# Patient Record
Sex: MALE | Race: WHITE | NOT HISPANIC OR LATINO | Employment: OTHER | URBAN - METROPOLITAN AREA
[De-identification: names, ages, dates, MRNs, and addresses within clinical notes are randomized per-mention and may not be internally consistent; named-entity substitution may affect disease eponyms.]

---

## 2024-01-29 ENCOUNTER — HOSPITAL ENCOUNTER (INPATIENT)
Facility: CLINIC | Age: 79
LOS: 2 days | Discharge: HOME | DRG: 698 | End: 2024-01-31
Attending: NURSE PRACTITIONER | Admitting: STUDENT IN AN ORGANIZED HEALTH CARE EDUCATION/TRAINING PROGRAM
Payer: COMMERCIAL

## 2024-01-29 ENCOUNTER — APPOINTMENT (OUTPATIENT)
Dept: CT IMAGING | Facility: CLINIC | Age: 79
DRG: 698 | End: 2024-01-29
Attending: NURSE PRACTITIONER
Payer: COMMERCIAL

## 2024-01-29 DIAGNOSIS — N39.0 UTI (URINARY TRACT INFECTION): ICD-10-CM

## 2024-01-29 DIAGNOSIS — R33.9 URINARY RETENTION WITH INCOMPLETE BLADDER EMPTYING: ICD-10-CM

## 2024-01-29 DIAGNOSIS — N39.0 URINARY TRACT INFECTION ASSOCIATED WITH INDWELLING URETHRAL CATHETER, INITIAL ENCOUNTER (H): Primary | ICD-10-CM

## 2024-01-29 DIAGNOSIS — T83.511A URINARY TRACT INFECTION ASSOCIATED WITH INDWELLING URETHRAL CATHETER, INITIAL ENCOUNTER (H): Primary | ICD-10-CM

## 2024-01-29 DIAGNOSIS — R74.02 ELEVATED SERUM LACTATE DEHYDROGENASE: ICD-10-CM

## 2024-01-29 DIAGNOSIS — A41.9 SEPSIS (H): ICD-10-CM

## 2024-01-29 DIAGNOSIS — R33.8 BENIGN PROSTATIC HYPERPLASIA WITH URINARY RETENTION: ICD-10-CM

## 2024-01-29 DIAGNOSIS — N40.1 BENIGN PROSTATIC HYPERPLASIA WITH URINARY RETENTION: ICD-10-CM

## 2024-01-29 LAB
ALBUMIN UR-MCNC: 100 MG/DL
ANION GAP SERPL CALCULATED.3IONS-SCNC: 14 MMOL/L (ref 7–15)
APPEARANCE UR: ABNORMAL
BASOPHILS # BLD AUTO: 0.1 10E3/UL (ref 0–0.2)
BASOPHILS NFR BLD AUTO: 0 %
BILIRUB UR QL STRIP: NEGATIVE
BUN SERPL-MCNC: 15.6 MG/DL (ref 8–23)
CALCIUM SERPL-MCNC: 10.3 MG/DL (ref 8.8–10.2)
CHLORIDE SERPL-SCNC: 104 MMOL/L (ref 98–107)
COLOR UR AUTO: YELLOW
CREAT SERPL-MCNC: 0.99 MG/DL (ref 0.67–1.17)
DEPRECATED HCO3 PLAS-SCNC: 18 MMOL/L (ref 22–29)
EGFRCR SERPLBLD CKD-EPI 2021: 78 ML/MIN/1.73M2
EOSINOPHIL # BLD AUTO: 0 10E3/UL (ref 0–0.7)
EOSINOPHIL NFR BLD AUTO: 0 %
ERYTHROCYTE [DISTWIDTH] IN BLOOD BY AUTOMATED COUNT: 13.5 % (ref 10–15)
GLUCOSE SERPL-MCNC: 163 MG/DL (ref 70–99)
GLUCOSE UR STRIP-MCNC: NEGATIVE MG/DL
HCT VFR BLD AUTO: 42.3 % (ref 40–53)
HGB BLD-MCNC: 14 G/DL (ref 13.3–17.7)
HGB UR QL STRIP: ABNORMAL
IMM GRANULOCYTES # BLD: 0.1 10E3/UL
IMM GRANULOCYTES NFR BLD: 0 %
KETONES UR STRIP-MCNC: NEGATIVE MG/DL
LACTATE SERPL-SCNC: 1.3 MMOL/L (ref 0.7–2)
LACTATE SERPL-SCNC: 2.7 MMOL/L (ref 0.7–2)
LACTATE SERPL-SCNC: 2.7 MMOL/L (ref 0.7–2)
LEUKOCYTE ESTERASE UR QL STRIP: ABNORMAL
LYMPHOCYTES # BLD AUTO: 0.6 10E3/UL (ref 0.8–5.3)
LYMPHOCYTES NFR BLD AUTO: 4 %
MCH RBC QN AUTO: 31.9 PG (ref 26.5–33)
MCHC RBC AUTO-ENTMCNC: 33.1 G/DL (ref 31.5–36.5)
MCV RBC AUTO: 96 FL (ref 78–100)
MONOCYTES # BLD AUTO: 1 10E3/UL (ref 0–1.3)
MONOCYTES NFR BLD AUTO: 7 %
MUCOUS THREADS #/AREA URNS LPF: PRESENT /LPF
NEUTROPHILS # BLD AUTO: 11.7 10E3/UL (ref 1.6–8.3)
NEUTROPHILS NFR BLD AUTO: 89 %
NITRATE UR QL: POSITIVE
NRBC # BLD AUTO: 0 10E3/UL
NRBC BLD AUTO-RTO: 0 /100
PH UR STRIP: 7 [PH] (ref 5–7)
PLATELET # BLD AUTO: 309 10E3/UL (ref 150–450)
POTASSIUM SERPL-SCNC: 4 MMOL/L (ref 3.4–5.3)
PROCALCITONIN SERPL IA-MCNC: 0.18 NG/ML
RBC # BLD AUTO: 4.39 10E6/UL (ref 4.4–5.9)
RBC URINE: 37 /HPF
SODIUM SERPL-SCNC: 136 MMOL/L (ref 135–145)
SP GR UR STRIP: 1.01 (ref 1–1.03)
TRANSITIONAL EPI: 2 /HPF
UROBILINOGEN UR STRIP-MCNC: NORMAL MG/DL
WBC # BLD AUTO: 13.4 10E3/UL (ref 4–11)
WBC CLUMPS #/AREA URNS HPF: PRESENT /HPF
WBC URINE: >182 /HPF

## 2024-01-29 PROCEDURE — 96361 HYDRATE IV INFUSION ADD-ON: CPT

## 2024-01-29 PROCEDURE — 81001 URINALYSIS AUTO W/SCOPE: CPT | Performed by: NURSE PRACTITIONER

## 2024-01-29 PROCEDURE — 99285 EMERGENCY DEPT VISIT HI MDM: CPT | Mod: 25

## 2024-01-29 PROCEDURE — 120N000001 HC R&B MED SURG/OB

## 2024-01-29 PROCEDURE — 87186 SC STD MICRODIL/AGAR DIL: CPT | Performed by: NURSE PRACTITIONER

## 2024-01-29 PROCEDURE — 83605 ASSAY OF LACTIC ACID: CPT | Performed by: NURSE PRACTITIONER

## 2024-01-29 PROCEDURE — 250N000011 HC RX IP 250 OP 636: Performed by: NURSE PRACTITIONER

## 2024-01-29 PROCEDURE — 85025 COMPLETE CBC W/AUTO DIFF WBC: CPT | Performed by: NURSE PRACTITIONER

## 2024-01-29 PROCEDURE — 80048 BASIC METABOLIC PNL TOTAL CA: CPT | Performed by: NURSE PRACTITIONER

## 2024-01-29 PROCEDURE — 258N000003 HC RX IP 258 OP 636

## 2024-01-29 PROCEDURE — 99222 1ST HOSP IP/OBS MODERATE 55: CPT | Mod: FS | Performed by: STUDENT IN AN ORGANIZED HEALTH CARE EDUCATION/TRAINING PROGRAM

## 2024-01-29 PROCEDURE — 36415 COLL VENOUS BLD VENIPUNCTURE: CPT | Performed by: NURSE PRACTITIONER

## 2024-01-29 PROCEDURE — 99207 PR APP CREDIT; MD BILLING SHARED VISIT: CPT

## 2024-01-29 PROCEDURE — 74176 CT ABD & PELVIS W/O CONTRAST: CPT

## 2024-01-29 PROCEDURE — 84145 PROCALCITONIN (PCT): CPT | Performed by: NURSE PRACTITIONER

## 2024-01-29 PROCEDURE — 87086 URINE CULTURE/COLONY COUNT: CPT | Performed by: NURSE PRACTITIONER

## 2024-01-29 PROCEDURE — 250N000013 HC RX MED GY IP 250 OP 250 PS 637: Performed by: STUDENT IN AN ORGANIZED HEALTH CARE EDUCATION/TRAINING PROGRAM

## 2024-01-29 PROCEDURE — 96365 THER/PROPH/DIAG IV INF INIT: CPT

## 2024-01-29 PROCEDURE — 99285 EMERGENCY DEPT VISIT HI MDM: CPT | Mod: 25 | Performed by: EMERGENCY MEDICINE

## 2024-01-29 PROCEDURE — 258N000003 HC RX IP 258 OP 636: Performed by: NURSE PRACTITIONER

## 2024-01-29 PROCEDURE — 87149 DNA/RNA DIRECT PROBE: CPT | Performed by: NURSE PRACTITIONER

## 2024-01-29 PROCEDURE — 250N000009 HC RX 250: Performed by: NURSE PRACTITIONER

## 2024-01-29 RX ORDER — LIDOCAINE HYDROCHLORIDE 20 MG/ML
JELLY TOPICAL ONCE
Status: COMPLETED | OUTPATIENT
Start: 2024-01-29 | End: 2024-01-29

## 2024-01-29 RX ORDER — CLONAZEPAM 0.5 MG/1
.25-.5 TABLET ORAL DAILY PRN
COMMUNITY
Start: 2023-11-20

## 2024-01-29 RX ORDER — POLYETHYLENE GLYCOL 3350 17 G/17G
17 POWDER, FOR SOLUTION ORAL DAILY PRN
Status: DISCONTINUED | OUTPATIENT
Start: 2024-01-29 | End: 2024-01-31 | Stop reason: HOSPADM

## 2024-01-29 RX ORDER — ACETAMINOPHEN 325 MG/1
650 TABLET ORAL EVERY 4 HOURS PRN
Status: DISCONTINUED | OUTPATIENT
Start: 2024-01-29 | End: 2024-01-31 | Stop reason: HOSPADM

## 2024-01-29 RX ORDER — ONDANSETRON 4 MG/1
4 TABLET, ORALLY DISINTEGRATING ORAL EVERY 6 HOURS PRN
Status: DISCONTINUED | OUTPATIENT
Start: 2024-01-29 | End: 2024-01-31 | Stop reason: HOSPADM

## 2024-01-29 RX ORDER — SODIUM CHLORIDE, SODIUM LACTATE, POTASSIUM CHLORIDE, CALCIUM CHLORIDE 600; 310; 30; 20 MG/100ML; MG/100ML; MG/100ML; MG/100ML
1000 INJECTION, SOLUTION INTRAVENOUS CONTINUOUS
Status: DISCONTINUED | OUTPATIENT
Start: 2024-01-29 | End: 2024-01-31

## 2024-01-29 RX ORDER — PANTOPRAZOLE SODIUM 40 MG/1
40 TABLET, DELAYED RELEASE ORAL 2 TIMES DAILY
Status: DISCONTINUED | OUTPATIENT
Start: 2024-01-29 | End: 2024-01-31 | Stop reason: HOSPADM

## 2024-01-29 RX ORDER — CEFTRIAXONE 2 G/1
2 INJECTION, POWDER, FOR SOLUTION INTRAMUSCULAR; INTRAVENOUS EVERY 24 HOURS
Status: DISCONTINUED | OUTPATIENT
Start: 2024-01-30 | End: 2024-01-31

## 2024-01-29 RX ORDER — CLONAZEPAM 0.5 MG/1
.25-.5 TABLET ORAL DAILY PRN
Status: DISCONTINUED | OUTPATIENT
Start: 2024-01-29 | End: 2024-01-30

## 2024-01-29 RX ORDER — CALCIUM CARBONATE 500 MG/1
1000 TABLET, CHEWABLE ORAL 4 TIMES DAILY PRN
Status: DISCONTINUED | OUTPATIENT
Start: 2024-01-29 | End: 2024-01-29

## 2024-01-29 RX ORDER — SODIUM CHLORIDE 9 MG/ML
INJECTION, SOLUTION INTRAVENOUS CONTINUOUS
Status: DISCONTINUED | OUTPATIENT
Start: 2024-01-29 | End: 2024-01-29

## 2024-01-29 RX ORDER — ONDANSETRON 2 MG/ML
4 INJECTION INTRAMUSCULAR; INTRAVENOUS EVERY 6 HOURS PRN
Status: DISCONTINUED | OUTPATIENT
Start: 2024-01-29 | End: 2024-01-31 | Stop reason: HOSPADM

## 2024-01-29 RX ORDER — LANSOPRAZOLE 30 MG/1
30 CAPSULE, DELAYED RELEASE ORAL 2 TIMES DAILY
COMMUNITY
Start: 2023-12-07

## 2024-01-29 RX ORDER — AMOXICILLIN 250 MG
1-2 CAPSULE ORAL 2 TIMES DAILY
Status: DISCONTINUED | OUTPATIENT
Start: 2024-01-29 | End: 2024-01-31 | Stop reason: HOSPADM

## 2024-01-29 RX ORDER — MULTIVITAMIN
1 TABLET ORAL DAILY
COMMUNITY
Start: 2023-11-20

## 2024-01-29 RX ORDER — LANSOPRAZOLE 30 MG/1
30 CAPSULE, DELAYED RELEASE ORAL 2 TIMES DAILY
Status: DISCONTINUED | OUTPATIENT
Start: 2024-01-29 | End: 2024-01-29

## 2024-01-29 RX ORDER — CEFTRIAXONE 2 G/1
2 INJECTION, POWDER, FOR SOLUTION INTRAMUSCULAR; INTRAVENOUS ONCE
Status: COMPLETED | OUTPATIENT
Start: 2024-01-29 | End: 2024-01-29

## 2024-01-29 RX ADMIN — CEFTRIAXONE SODIUM 2 G: 2 INJECTION, POWDER, FOR SOLUTION INTRAMUSCULAR; INTRAVENOUS at 11:38

## 2024-01-29 RX ADMIN — SODIUM CHLORIDE, POTASSIUM CHLORIDE, SODIUM LACTATE AND CALCIUM CHLORIDE 2586 ML: 600; 310; 30; 20 INJECTION, SOLUTION INTRAVENOUS at 12:11

## 2024-01-29 RX ADMIN — SODIUM CHLORIDE, POTASSIUM CHLORIDE, SODIUM LACTATE AND CALCIUM CHLORIDE 1000 ML: 600; 310; 30; 20 INJECTION, SOLUTION INTRAVENOUS at 17:00

## 2024-01-29 RX ADMIN — LIDOCAINE HYDROCHLORIDE: 20 JELLY TOPICAL at 10:32

## 2024-01-29 RX ADMIN — PANTOPRAZOLE SODIUM 40 MG: 40 TABLET, DELAYED RELEASE ORAL at 20:23

## 2024-01-29 ASSESSMENT — ACTIVITIES OF DAILY LIVING (ADL)
CONCENTRATING,_REMEMBERING_OR_MAKING_DECISIONS_DIFFICULTY: NO
ADLS_ACUITY_SCORE: 22
TOILETING_ISSUES: NO
ADLS_ACUITY_SCORE: 22
DOING_ERRANDS_INDEPENDENTLY_DIFFICULTY: YES
HEARING_DIFFICULTY_OR_DEAF: NO
WALKING_OR_CLIMBING_STAIRS_DIFFICULTY: NO
DRESSING/BATHING_DIFFICULTY: NO
FALL_HISTORY_WITHIN_LAST_SIX_MONTHS: NO
ADLS_ACUITY_SCORE: 22
ADLS_ACUITY_SCORE: 33
WEAR_GLASSES_OR_BLIND: NO
ADLS_ACUITY_SCORE: 35
DIFFICULTY_COMMUNICATING: NO
ADLS_ACUITY_SCORE: 35
ADLS_ACUITY_SCORE: 20
CHANGE_IN_FUNCTIONAL_STATUS_SINCE_ONSET_OF_CURRENT_ILLNESS/INJURY: NO
DIFFICULTY_EATING/SWALLOWING: NO

## 2024-01-29 NOTE — ED PROVIDER NOTES
History     Chief Complaint   Patient presents with    Catheter Problem     HPI  Jerry Jay is a 78 year old male who presents emergency room with complications with his Hendricks catheter.  Patient states the catheter stopped draining last evening.  He reports throughout the night he has developed suprapubic pain that has progressively worsened.  He currently rates his pain 10 out of 10.  He states he has tried to readjust the catheter without resolution.  He reports chronic shortness of breath that is unchanged.  He denies fevers, chills, sweats.  He reports he is chronically constipated.  He also reports he has a laser TURP scheduled for the middle of February.  Currently, he is utilizing the catheter is for lower bladder outlet obstruction secondary to benign prostatic hypertrophy.  He reports feeling well otherwise and denies any left or right-sided facial droop, speech difficulty, memory loss, chest pain.    Allergies:  Allergies   Allergen Reactions    Pcn [Penicillins] Hives       Problem List:    Patient Active Problem List    Diagnosis Date Noted    Sepsis (H) 2024     Priority: Medium    CARDIOVASCULAR SCREENING; LDL GOAL LESS THAN 160 10/31/2010     Priority: Medium        Past Medical History:    No past medical history on file.    Past Surgical History:    No past surgical history on file.    Family History:    No family history on file.    Social History:  Marital Status:  Single [1]  Social History     Tobacco Use    Smoking status: Former     Types: Cigarettes     Quit date: 1980     Years since quittin.5   Substance Use Topics    Alcohol use: No    Drug use: No        Medications:    clonazePAM (KLONOPIN) 0.5 MG tablet  LANsoprazole (PREVACID) 30 MG DR capsule  Multiple Vitamin (ONE-A-DAY ESSENTIAL) TABS      Review of Systems  As mentioned above in the history present illness. All other systems were reviewed and are negative.    Physical Exam   BP: (!) 156/99  Pulse: (!)  128  Resp: 18  Weight: 86.2 kg (190 lb)  SpO2: 96 %      Physical Exam  Vitals and nursing note reviewed.   Constitutional:       General: He is in acute distress.      Appearance: He is well-developed. He is not ill-appearing, toxic-appearing or diaphoretic.      Comments: Disheveled, dirty clothes   HENT:      Head: Normocephalic and atraumatic.      Right Ear: Hearing and external ear normal.      Left Ear: Hearing and external ear normal.      Nose: Nose normal.   Eyes:      General: No scleral icterus.        Right eye: No discharge.         Left eye: No discharge.      Conjunctiva/sclera: Conjunctivae normal.   Cardiovascular:      Rate and Rhythm: Normal rate and regular rhythm.      Heart sounds: Normal heart sounds. No murmur heard.     No friction rub. No gallop.   Pulmonary:      Effort: Pulmonary effort is normal. No respiratory distress.      Breath sounds: Normal breath sounds. No stridor. No wheezing or rales.   Abdominal:      General: Bowel sounds are normal. There is no distension.      Palpations: Abdomen is soft. There is no mass.      Tenderness: There is no abdominal tenderness. There is no right CVA tenderness, left CVA tenderness or guarding.      Hernia: No hernia is present.   Genitourinary:     Comments: Catheter is noted to be in place, some opaque mucus noted at the meatus.  Feces noted around the scrotum.  Musculoskeletal:         General: No tenderness.   Skin:     General: Skin is warm.      Capillary Refill: Capillary refill takes less than 2 seconds.      Findings: No rash.   Neurological:      Mental Status: He is alert and oriented to person, place, and time.   Psychiatric:         Mood and Affect: Mood normal.         Behavior: Behavior is cooperative.         ED Course              ED Course as of 01/29/24 1501   Mon Jan 29, 2024   1130 Lactic acid whole blood(!)  Lactic acid noted to be elevated at 2.7.  Possible dehydration and UTI versus urosepsis.  Will order fluid bolus,  cefepime and repeat lactic acid.   1311 UA with Microscopic reflex to Culture(!)  Urinalysis reveals positive nitrates, large leukocyte esterase, white blood cell clumps, greater than 182 white blood cells noted in urine consistent with UTI and possible urosepsis.   1312 CBC with platelets differential(!)  CBC is noted to be elevated at 13.4 could be inflammatory versus infectious etiology.  Initiating workup for sepsis.   1312 Procalcitonin  Procalcitonin reassuring at 0.18.   1312 CT Abdomen Pelvis w/o Contrast   1313 CT Abdomen Pelvis w/o Contrast  IMPRESSION:   1.  Mild to moderate bilateral hydroureteronephrosis to the  ureterovesical junctions, likely sequela of outlet obstruction.  2.  Nonobstructing left distal ureteral 0.4 cm calculus, and bilateral  nonobstructing renal calculi.  3.  High density urinary bladder fluid and perivesicular ill-defined  fluid/stranding (some of which is high density) could reflect blood  products and/or sequelae of infection. Correlate with urinalysis.  4.  Mild periureteral and perinephric inflammation could reflect  sequelae of pyelitis/pyelonephritis, not well assessed due to  noncontrast technique.  5.  Trace right greater than left pleural effusions.  6.  Indeterminate right adrenal gland thickening, and 2.2 cm  peritoneal nodule posterior to the right adrenal gland. Consider  correlation with outside imaging or attention on follow-up.     1446 Consultation completed with hospitalist Dr. Geo Jimenez, requesting hospitalization for urosepsis.  He agrees to the admission status.  Will place orders.  Dr. Jamison Steven is collaborative physician on care of this case.     Procedures      Results for orders placed or performed during the hospital encounter of 01/29/24 (from the past 24 hour(s))   Basic metabolic panel   Result Value Ref Range    Sodium 136 135 - 145 mmol/L    Potassium 4.0 3.4 - 5.3 mmol/L    Chloride 104 98 - 107 mmol/L    Carbon Dioxide (CO2) 18 (L) 22 - 29  mmol/L    Anion Gap 14 7 - 15 mmol/L    Urea Nitrogen 15.6 8.0 - 23.0 mg/dL    Creatinine 0.99 0.67 - 1.17 mg/dL    GFR Estimate 78 >60 mL/min/1.73m2    Calcium 10.3 (H) 8.8 - 10.2 mg/dL    Glucose 163 (H) 70 - 99 mg/dL   Lactic acid whole blood   Result Value Ref Range    Lactic Acid 2.7 (H) 0.7 - 2.0 mmol/L   CBC with platelets differential    Narrative    The following orders were created for panel order CBC with platelets differential.  Procedure                               Abnormality         Status                     ---------                               -----------         ------                     CBC with platelets and d...[[id number]]  Abnormal            Final result                 Please view results for these tests on the individual orders.   Procalcitonin   Result Value Ref Range    Procalcitonin 0.18 <0.50 ng/mL   CBC with platelets and differential   Result Value Ref Range    WBC Count 13.4 (H) 4.0 - 11.0 10e3/uL    RBC Count 4.39 (L) 4.40 - 5.90 10e6/uL    Hemoglobin 14.0 13.3 - 17.7 g/dL    Hematocrit 42.3 40.0 - 53.0 %    MCV 96 78 - 100 fL    MCH 31.9 26.5 - 33.0 pg    MCHC 33.1 31.5 - 36.5 g/dL    RDW 13.5 10.0 - 15.0 %    Platelet Count 309 150 - 450 10e3/uL    % Neutrophils 89 %    % Lymphocytes 4 %    % Monocytes 7 %    % Eosinophils 0 %    % Basophils 0 %    % Immature Granulocytes 0 %    NRBCs per 100 WBC 0 <1 /100    Absolute Neutrophils 11.7 (H) 1.6 - 8.3 10e3/uL    Absolute Lymphocytes 0.6 (L) 0.8 - 5.3 10e3/uL    Absolute Monocytes 1.0 0.0 - 1.3 10e3/uL    Absolute Eosinophils 0.0 0.0 - 0.7 10e3/uL    Absolute Basophils 0.1 0.0 - 0.2 10e3/uL    Absolute Immature Granulocytes 0.1 <=0.4 10e3/uL    Absolute NRBCs 0.0 10e3/uL   UA with Microscopic reflex to Culture    Specimen: Urine, Hendricks Catheter   Result Value Ref Range    Color Urine Yellow Colorless, Straw, Light Yellow, Yellow    Appearance Urine Cloudy (A) Clear    Glucose Urine Negative Negative mg/dL    Bilirubin Urine  Negative Negative    Ketones Urine Negative Negative mg/dL    Specific Gravity Urine 1.012 1.003 - 1.035    Blood Urine Small (A) Negative    pH Urine 7.0 5.0 - 7.0    Protein Albumin Urine 100 (A) Negative mg/dL    Urobilinogen Urine Normal Normal, 2.0 mg/dL    Nitrite Urine Positive (A) Negative    Leukocyte Esterase Urine Large (A) Negative    WBC Clumps Urine Present (A) None Seen /HPF    Mucus Urine Present (A) None Seen /LPF    RBC Urine 37 (H) <=2 /HPF    WBC Urine >182 (H) <=5 /HPF    Transitional Epithelials Urine 2 (H) <=1 /HPF    Narrative    Urine Culture ordered based on laboratory criteria   CT Abdomen Pelvis w/o Contrast    Narrative    CT ABDOMEN/PELVIS WITHOUT CONTRAST January 29, 2024 11:51 AM    CLINICAL HISTORY: Rule out ureteral obstructive process.     TECHNIQUE: CT scan of the abdomen and pelvis was performed without IV  contrast. Multiplanar reformats were obtained. Dose reduction  techniques were used.  CONTRAST: None.    COMPARISON: None available.    FINDINGS:   LOWER CHEST: Trace right greater than left pleural effusions and  adjacent atelectasis.    HEPATOBILIARY: No significant mass or bile duct dilatation. No  calcified gallstones.     PANCREAS: No significant mass, duct dilatation, or inflammatory  change.    SPLEEN: Unremarkable.    ADRENAL GLANDS: Low-attenuation right adrenal gland thickening.  Posterior and separate to the right adrenal gland is a 1.3 x 2.2 cm  low-attenuation nodule (5/40).    KIDNEYS: Bilateral mild to moderate hydroureteronephrosis to the  ureterovesical junctions. Bilateral mild periureteral and perinephric  fat stranding. Nonobstructing left distal 0.4 cm calculus (5/182).  Nonobstructing bilateral renal calculi.     Multiple urinary bladder calculi. Urinary bladder is decompressed by  Hendricks catheter. Ill-defined perivesicular fluid. High density urinary  bladder fluid and trace gas.    BOWEL: No obstruction or inflammatory change.    VASCULATURE:  Nonaneurysmal abdominal aorta.    LYMPH NODE AND PERITONEUM: No enlarged lymph node.    MUSCULOSKELETAL: No aggressive osseous lesion. Mild degenerative  changes of the spine.    OTHER: None.      Impression    IMPRESSION:   1.  Mild to moderate bilateral hydroureteronephrosis to the  ureterovesical junctions, likely sequela of outlet obstruction.  2.  Nonobstructing left distal ureteral 0.4 cm calculus, and bilateral  nonobstructing renal calculi.  3.  High density urinary bladder fluid and perivesicular ill-defined  fluid/stranding (some of which is high density) could reflect blood  products and/or sequelae of infection. Correlate with urinalysis.  4.  Mild periureteral and perinephric inflammation could reflect  sequelae of pyelitis/pyelonephritis, not well assessed due to  noncontrast technique.  5.  Trace right greater than left pleural effusions.  6.  Indeterminate right adrenal gland thickening, and 2.2 cm  peritoneal nodule posterior to the right adrenal gland. Consider  correlation with outside imaging or attention on follow-up.   Lactic acid whole blood   Result Value Ref Range    Lactic Acid 2.7 (H) 0.7 - 2.0 mmol/L       Medications   lidocaine (XYLOCAINE) 2 % external gel ( Urethral $Given 1/29/24 1032)   lactated ringers BOLUS 2,586 mL (2,586 mLs Intravenous $New Bag 1/29/24 1211)   cefTRIAXone (ROCEPHIN) 2 g vial to attach to  ml bag for ADULTS or NS 50 ml bag for PEDS (0 g Intravenous Stopped 1/29/24 1210)       Assessments & Plan (with Medical Decision Making)     I have reviewed the nursing notes.    I have reviewed the findings, diagnosis, plan and need for follow up with the patient.  Jerry Jay is a 78 year old male who presents emergency room with complications with his Hendricks catheter.  Patient states the catheter stopped draining last evening.  He reports throughout the night he has developed suprapubic pain that has progressively worsened.  He currently rates his pain 10 out of  10.  He states he has tried to readjust the catheter without resolution.  He reports chronic shortness of breath that is unchanged.  He denies fevers, chills, sweats.  He reports he is chronically constipated.  He also reports he has a laser TURP scheduled for the middle of February.  Currently, he is utilizing the catheter is for lower bladder outlet obstruction secondary to benign prostatic hypertrophy.  Workup to evaluate for UTI versus sepsis as patient is tachycardic.      Initial lactate is elevated resulting in orders for blood cultures, fluid resuscitation, antibiotic, and repeat lactate, UA, UC.  Following fluid resuscitation, repeat lactate is still elevated at 2.7.  CT scan of the abdomen pelvis reveals no ureteral stone process.  No history on urine cultures for MRSA and therefore vancomycin not initiated.  Phone consultation completed with hospitalist who agrees to admission for placement.  Discussed patient may be a good candidate for outpatient nursing care or  for assistance with home care.  This was agreed upon and admission orders placed.  Dr. Jamison Steven is collaborative physician in care of this patient.      New Prescriptions    No medications on file       Final diagnoses:   Sepsis (H)   Urinary retention with incomplete bladder emptying   UTI (urinary tract infection)   Elevated serum lactate dehydrogenase   Benign prostatic hyperplasia with urinary retention       1/29/2024   [facility] EMERGENCY DEPT       Robert, Lorena Gilmore, APRN CNP  01/29/24 6684

## 2024-01-29 NOTE — ED TRIAGE NOTES
Catheter stopped draining sometime in the night, feeling bladder distention and pain     Triage Assessment (Adult)       Row Name 01/29/24 0925          Triage Assessment    Airway WDL WDL        Respiratory WDL    Respiratory WDL WDL        Peripheral/Neurovascular WDL    Peripheral Neurovascular WDL WDL        Cognitive/Neuro/Behavioral WDL    Cognitive/Neuro/Behavioral WDL WDL

## 2024-01-29 NOTE — ED NOTES
[facility]   Admission Handoff    The patient is Jerry Jay, 78 year old who arrived in the ED by WALKED from home with a complaint of Catheter Problem  . The patient's current symptoms are a recurrence of a past episode and during this time the symptoms have increased. In the ED, patient was diagnosed with   Final diagnoses:   Sepsis (H)   Urinary retention with incomplete bladder emptying   UTI (urinary tract infection)   Elevated serum lactate dehydrogenase   Benign prostatic hyperplasia with urinary retention         Needed?: No    Allergies:    Allergies   Allergen Reactions    Pcn [Penicillins] Hives       Past Medical Hx: No past medical history on file.    Initial vitals were: BP: (!) 156/99  Pulse: (!) 128  Resp: 18  Weight: 86.2 kg (190 lb)  SpO2: 96 %   Recent vital Signs: BP (!) 154/87   Pulse 108   Resp 18   Wt 86.2 kg (190 lb)   SpO2 98%     Elimination Status: Continent: indwelling catheter     Activity Level: SBA    Fall Status: Reason for falls risk: Elimination  nonskid shoes/slippers when out of bed, arm band in place, and patient and family education    Baseline Mental status: WDL  Current Mental Status changes: at basesline    Infection present or suspected this encounter: cultures pending  Sepsis suspected: Yes    Isolation type: n/a    Bariatric equipment needed?: No    In the ED these meds were given:   Medications   lidocaine (XYLOCAINE) 2 % external gel ( Urethral $Given 1/29/24 1032)   lactated ringers BOLUS 2,586 mL (2,586 mLs Intravenous $New Bag 1/29/24 1211)   cefTRIAXone (ROCEPHIN) 2 g vial to attach to  ml bag for ADULTS or NS 50 ml bag for PEDS (0 g Intravenous Stopped 1/29/24 1210)       Drips running?  Yes    Home pump  No    Current LDAs: Peripheral IV: Site left wrist; Gauge 20G  lactated Ringer's     Results:   Labs/Imaging  Ordered and Resulted from Time of ED Arrival Up to the Time of Departure from the ED  Results for  orders placed or performed during the hospital encounter of 01/29/24 (from the past 24 hour(s))   Basic metabolic panel   Result Value Ref Range    Sodium 136 135 - 145 mmol/L    Potassium 4.0 3.4 - 5.3 mmol/L    Chloride 104 98 - 107 mmol/L    Carbon Dioxide (CO2) 18 (L) 22 - 29 mmol/L    Anion Gap 14 7 - 15 mmol/L    Urea Nitrogen 15.6 8.0 - 23.0 mg/dL    Creatinine 0.99 0.67 - 1.17 mg/dL    GFR Estimate 78 >60 mL/min/1.73m2    Calcium 10.3 (H) 8.8 - 10.2 mg/dL    Glucose 163 (H) 70 - 99 mg/dL   Lactic acid whole blood   Result Value Ref Range    Lactic Acid 2.7 (H) 0.7 - 2.0 mmol/L   CBC with platelets differential    Narrative    The following orders were created for panel order CBC with platelets differential.  Procedure                               Abnormality         Status                     ---------                               -----------         ------                     CBC with platelets and d...[[id number]]  Abnormal            Final result                 Please view results for these tests on the individual orders.   Procalcitonin   Result Value Ref Range    Procalcitonin 0.18 <0.50 ng/mL   CBC with platelets and differential   Result Value Ref Range    WBC Count 13.4 (H) 4.0 - 11.0 10e3/uL    RBC Count 4.39 (L) 4.40 - 5.90 10e6/uL    Hemoglobin 14.0 13.3 - 17.7 g/dL    Hematocrit 42.3 40.0 - 53.0 %    MCV 96 78 - 100 fL    MCH 31.9 26.5 - 33.0 pg    MCHC 33.1 31.5 - 36.5 g/dL    RDW 13.5 10.0 - 15.0 %    Platelet Count 309 150 - 450 10e3/uL    % Neutrophils 89 %    % Lymphocytes 4 %    % Monocytes 7 %    % Eosinophils 0 %    % Basophils 0 %    % Immature Granulocytes 0 %    NRBCs per 100 WBC 0 <1 /100    Absolute Neutrophils 11.7 (H) 1.6 - 8.3 10e3/uL    Absolute Lymphocytes 0.6 (L) 0.8 - 5.3 10e3/uL    Absolute Monocytes 1.0 0.0 - 1.3 10e3/uL    Absolute Eosinophils 0.0 0.0 - 0.7 10e3/uL    Absolute Basophils 0.1 0.0 - 0.2 10e3/uL    Absolute Immature Granulocytes 0.1 <=0.4 10e3/uL    Absolute  NRBCs 0.0 10e3/uL   UA with Microscopic reflex to Culture    Specimen: Urine, Hendricks Catheter   Result Value Ref Range    Color Urine Yellow Colorless, Straw, Light Yellow, Yellow    Appearance Urine Cloudy (A) Clear    Glucose Urine Negative Negative mg/dL    Bilirubin Urine Negative Negative    Ketones Urine Negative Negative mg/dL    Specific Gravity Urine 1.012 1.003 - 1.035    Blood Urine Small (A) Negative    pH Urine 7.0 5.0 - 7.0    Protein Albumin Urine 100 (A) Negative mg/dL    Urobilinogen Urine Normal Normal, 2.0 mg/dL    Nitrite Urine Positive (A) Negative    Leukocyte Esterase Urine Large (A) Negative    WBC Clumps Urine Present (A) None Seen /HPF    Mucus Urine Present (A) None Seen /LPF    RBC Urine 37 (H) <=2 /HPF    WBC Urine >182 (H) <=5 /HPF    Transitional Epithelials Urine 2 (H) <=1 /HPF    Narrative    Urine Culture ordered based on laboratory criteria   CT Abdomen Pelvis w/o Contrast    Narrative    CT ABDOMEN/PELVIS WITHOUT CONTRAST January 29, 2024 11:51 AM    CLINICAL HISTORY: Rule out ureteral obstructive process.     TECHNIQUE: CT scan of the abdomen and pelvis was performed without IV  contrast. Multiplanar reformats were obtained. Dose reduction  techniques were used.  CONTRAST: None.    COMPARISON: None available.    FINDINGS:   LOWER CHEST: Trace right greater than left pleural effusions and  adjacent atelectasis.    HEPATOBILIARY: No significant mass or bile duct dilatation. No  calcified gallstones.     PANCREAS: No significant mass, duct dilatation, or inflammatory  change.    SPLEEN: Unremarkable.    ADRENAL GLANDS: Low-attenuation right adrenal gland thickening.  Posterior and separate to the right adrenal gland is a 1.3 x 2.2 cm  low-attenuation nodule (5/40).    KIDNEYS: Bilateral mild to moderate hydroureteronephrosis to the  ureterovesical junctions. Bilateral mild periureteral and perinephric  fat stranding. Nonobstructing left distal 0.4 cm calculus (5/182).  Nonobstructing  bilateral renal calculi.     Multiple urinary bladder calculi. Urinary bladder is decompressed by  Hendricks catheter. Ill-defined perivesicular fluid. High density urinary  bladder fluid and trace gas.    BOWEL: No obstruction or inflammatory change.    VASCULATURE: Nonaneurysmal abdominal aorta.    LYMPH NODE AND PERITONEUM: No enlarged lymph node.    MUSCULOSKELETAL: No aggressive osseous lesion. Mild degenerative  changes of the spine.    OTHER: None.      Impression    IMPRESSION:   1.  Mild to moderate bilateral hydroureteronephrosis to the  ureterovesical junctions, likely sequela of outlet obstruction.  2.  Nonobstructing left distal ureteral 0.4 cm calculus, and bilateral  nonobstructing renal calculi.  3.  High density urinary bladder fluid and perivesicular ill-defined  fluid/stranding (some of which is high density) could reflect blood  products and/or sequelae of infection. Correlate with urinalysis.  4.  Mild periureteral and perinephric inflammation could reflect  sequelae of pyelitis/pyelonephritis, not well assessed due to  noncontrast technique.  5.  Trace right greater than left pleural effusions.  6.  Indeterminate right adrenal gland thickening, and 2.2 cm  peritoneal nodule posterior to the right adrenal gland. Consider  correlation with outside imaging or attention on follow-up.   Lactic acid whole blood   Result Value Ref Range    Lactic Acid 2.7 (H) 0.7 - 2.0 mmol/L       For the majority of the shift this patient's behavior was Green     Cardiac Rhythm: Tachycardia  Pt needs tele? Yes  Skin/wound Issues: None    Code Status: not addressed    Pain control: good    Nausea control: good    Abnormal labs/tests/findings requiring intervention: elevated lactic    Patient tested for COVID 19 prior to admission: NO     OBS brochure/video discussed/provided to patient/family: N/A     Family present during ED course? No     Family Comments/Social Situation comments: Lives alone at home    Tasks needing  completion:  Finish up the rest of the bolus.  On pump    Love Villalpando RN

## 2024-01-29 NOTE — H&P
[facility]    History and Physical  Hospital Medicine       Date of Admission:  1/29/2024  Date of Service: 1/29/2024     Assessment & Plan   Jerry Jay is a 78 year old male with a medical history of Obstructive uropathy s/p bilateral renal stent placement, GERD, nephrolithiasis, complicated UTI, right adrenal mass who presents on 1/29/2024 with abdominal pain. He is found to have acute pyelonephritis and is being admitted for management.     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 Presents with abdominal pain & clogged catheter x1day.  Astorga exchanged in ER & now draining.    Septic based on leukocytosis (13.4), tachycardia (128bpm). UA after switching astorga catheter appears obviuosly infected (cloudy, positive nitrite, small blood, large leuk esterase with >182 WBC, 37 RBC, mucus. 2 transitional epi cells). CT shows mild to moderate bilateral hydroureteronephrosis to ureterovesical junctions, non-obstructing calculi bilaterally, high density urinary bladder fluid & stranding probably blood or sequelae of infection, periureteral & perinephric inflammation. Initiated on ceftriaxone 2g in ER.   -Continue ceftriaxone 2g IV Q24hrs  -Lactated ringer 100/hr continuous for gentle fluid maintenance  -Measure urine output  -Good astorga cares  -Blood cultures x2 1/29 NGTD  -Urine culture 1/29 NGTD  -CBC & CMP in AM  -Continue outpatient follow up with urology for ongoing prep for planned TURP 2/13/24.     Failure to thrive  Patient presented to ER covered in stool, and per EMS report appeared to be having difficulty adequately providing sanitary astorga cares for himself at home. This likely contributed to acute infection, & patient may  benefit from sdditional help at home or placement for adequate cares.   -PT & OT consults  -Care management consult for safe disposition planning    Renal calculi  Multiple non-obstructing stones bilaterally on CT imaging 1/29, as well as 0.4cm non-obstructing calculus in left distal ureter. Patient is s/p bilateral ureteral stents with removal on 12/28/23 for history of obstructing stones.   -Monitor, continue outpatient follow up for ongoing management. No acute interventions warranted today for non-obstructing calculi.     Anxiety  Noted in history, managed PTA with clonazepam 0.25-0.5mg daily PRN for anxiety.,   -Continue PTA clonazepam, use judiciously    GERD  Endorses typical symptoms present on admission. Managed PTA with lansoprazole 30mg BID, continue.     Dysphagia  Endorses difficulty swallowing at baseline and follows soft diet. Per chart review has documented history of dysphagia on swallowing study. Prefers mac n cheese and eggs. Does not sound like he is thickening liquids but per chart review he is supposed to do this due to aspiration with thin & honey consistencies.   -Minced & moist diet  -Swallow consult to assist with proper thickening requirements    Clinically Significant Risk Factors Present on Admission           # Hypercalcemia: Highest Ca = 10.3 mg/dL in last 2 days, will monitor as appropriate                         Diet:  regular  DVT Prophylaxis: Ambulate every shift  Astorga Catheter: PRESENT, indication: Obstruction;Retention  Code Status:  full code  Lines: PIV, astorga catheter    Disposition Plan      Expected Discharge Date: 01/31/2024             Entered: Radha Ortiz PA-C 01/29/2024, 3:20 PM     Status: Patient is appropriate for inpatient  Radha Ortiz PA-C        The patient's care was discussed with the Attending Physician, Dr. Cuate Jacobo, bedside RN, and the patient .    Primary Care Physician   Layne Pedraza    History is obtained from the patient, who  is an ok historian, handoff from ER provider, and review of old records via the EMR.    History of Present Illness   Jerry Jay is a 78 year old male with past medical history of Obstructive uropathy, GERD, nephrolithiasis, complicated UTI, right adrenal mass now presents on 1/29/2024 with abdominal pain & decreased output from catheter.     Patient was previously admitted to [facility] 11/23 - 11/25/23 for obstructive uropathy & abdominal bloating. Has a history of UTI with pansensitive proteus mirabilis, presented 11/23 with creatinine 6.78 and difficulty urinating that was relieved with catheter. Urology placed bilateral ureteral stents.   Followed with urology in clinic 12/28/23 at which time ureterla stents were removed & TURP was planned for Feb 13, 2024. Patient has had a chronic astorga since then.     Patient noticed evening 1/28 that his astorga was not draining. He has had an issue previously with astorga clogging requiring replacement. He attempted to resolve, but was unable to get catheter to drain. He developed suprapubic abdominal pain overnight. No back or flank pain. He began leaking urine out of penis around catheter, and pain became unbearable so he presented to ER for ongoing workup.   Since arrival to ER, he has been slightly nauseous, with episode of emesis. He endorses some mild sweats alternating with chills beginning early AM 1/29.     Chronic cough productive of scant white sputum is unchanged from baseline.     Upon arrival to ER patient received lab and imaging workup. He was initiated on ceftriaxone and astorga catheter was exchanged.     Review of Systems   Constitutional: denies weight loss  Eyes: denies changes in vision  HENT: denies changes in hearing  Respiratory: denies new or changing cough, dyspnea  Cardiovascular: denies chest pain, heart palpitations  Gastroenterology: denies constipation, diarrhea, GERD symptoms  Genitourinary: see HPI  Integumentary: no new  rashes or skin changes  Musculoskeletal: denies new muscle pain or joint trauma  Neuro: denies numbness, tingling, headaches, tremor  Psychiatric: denies significant changes to mood    Past Medical History    -Obstructive uropathy  -GERD  -Nephrolithiasis  -Complicated UTI  -Right adrenal mass    Past Surgical History   No past surgical history on file.     Prior to Admission Medications   Prior to Admission Medications   Prescriptions Last Dose Informant Patient Reported? Taking?   LANsoprazole (PREVACID) 30 MG DR capsule 1/29/2024 at am Self Yes Yes   Sig: Take 30 mg by mouth 2 times daily   Multiple Vitamin (ONE-A-DAY ESSENTIAL) TABS 1/29/2024 at am Self Yes Yes   Sig: Take 1 tablet by mouth daily   clonazePAM (KLONOPIN) 0.5 MG tablet 1/29/2024 at am Self Yes Yes   Sig: Take 0.25-0.5 mg by mouth daily as needed for anxiety or agitation      Facility-Administered Medications: None     Allergies   Allergies   Allergen Reactions    Pcn [Penicillins] Hives       Family History    No family history on file.    Social History   Social History     Socioeconomic History    Marital status: Single     Physical Exam   BP (!) 154/87   Pulse 108   Resp 18   Wt 86.2 kg (190 lb)   SpO2 98%      Weight: 190 lbs 0 oz There is no height or weight on file to calculate BMI.     Constitutional: Alert, cooperative, no apparent distress, appears nontoxic  Eyes: Eyes are clear  HENT: Lipoma on crown of head, non-tender. No evidence of cranial trauma.  Cardiovascular: Regular rate and rhythm, normal S1 and S2, and no murmur noted. Good peripheral pulses in wrists bilaterally. No pitting lower extremity edema.  Respiratory: Clear to auscultation bilaterally. Unlabored on room air.   GI: mildly protuberant & firm but not hard. Tender to palpation over suprapubic & LLQ. Nontender in other regions. No rebound, but does have voluntary guarding in LLQ. No involuntary guarding.   Genitourinary: Yellow urine with some sediment output from  astorga catheter. Some bloody mucus noticed around catheter insertion site.   Musculoskeletal: Normal muscle bulk, no obvious joint deformities.   Skin: Warm and dry, no rashes.   Neurologic: Neck supple.  is symmetric. Speech intact without facial droop.     Data   Data reviewed today:   Recent Labs   Lab 01/29/24  1043   WBC 13.4*   HGB 14.0   MCV 96         POTASSIUM 4.0   CHLORIDE 104   CO2 18*   BUN 15.6   CR 0.99   ANIONGAP 14   VADIM 10.3*   *     Recent Results (from the past 24 hour(s))   CT Abdomen Pelvis w/o Contrast    Narrative    CT ABDOMEN/PELVIS WITHOUT CONTRAST January 29, 2024 11:51 AM    CLINICAL HISTORY: Rule out ureteral obstructive process.     TECHNIQUE: CT scan of the abdomen and pelvis was performed without IV  contrast. Multiplanar reformats were obtained. Dose reduction  techniques were used.  CONTRAST: None.    COMPARISON: None available.    FINDINGS:   LOWER CHEST: Trace right greater than left pleural effusions and  adjacent atelectasis.    HEPATOBILIARY: No significant mass or bile duct dilatation. No  calcified gallstones.     PANCREAS: No significant mass, duct dilatation, or inflammatory  change.    SPLEEN: Unremarkable.    ADRENAL GLANDS: Low-attenuation right adrenal gland thickening.  Posterior and separate to the right adrenal gland is a 1.3 x 2.2 cm  low-attenuation nodule (5/40).    KIDNEYS: Bilateral mild to moderate hydroureteronephrosis to the  ureterovesical junctions. Bilateral mild periureteral and perinephric  fat stranding. Nonobstructing left distal 0.4 cm calculus (5/182).  Nonobstructing bilateral renal calculi.     Multiple urinary bladder calculi. Urinary bladder is decompressed by  Astorga catheter. Ill-defined perivesicular fluid. High density urinary  bladder fluid and trace gas.    BOWEL: No obstruction or inflammatory change.    VASCULATURE: Nonaneurysmal abdominal aorta.    LYMPH NODE AND PERITONEUM: No enlarged lymph  node.    MUSCULOSKELETAL: No aggressive osseous lesion. Mild degenerative  changes of the spine.    OTHER: None.      Impression    IMPRESSION:   1.  Mild to moderate bilateral hydroureteronephrosis to the  ureterovesical junctions, likely sequela of outlet obstruction.  2.  Nonobstructing left distal ureteral 0.4 cm calculus, and bilateral  nonobstructing renal calculi.  3.  High density urinary bladder fluid and perivesicular ill-defined  fluid/stranding (some of which is high density) could reflect blood  products and/or sequelae of infection. Correlate with urinalysis.  4.  Mild periureteral and perinephric inflammation could reflect  sequelae of pyelitis/pyelonephritis, not well assessed due to  noncontrast technique.  5.  Trace right greater than left pleural effusions.  6.  Indeterminate right adrenal gland thickening, and 2.2 cm  peritoneal nodule posterior to the right adrenal gland. Consider  correlation with outside imaging or attention on follow-up.

## 2024-01-29 NOTE — ED NOTES
"Pt arrived with urinary retention and an indwelling astorga catheter not draining.  RN attempted to flush without success.  Indwelling catheter removed.  Camryn-care done for patient.  Pt asked if he had showered lately as there as dried stool on penis and testicles.  Pt states he \"wiped it down\" yesterday and does so every other day.  Pt's hands are dirty and is touching catheter.  Pt instructed to wash hands frequently and not touch catheter tube due to risk of infection.  New cather inserted.  Pt states pressure is decreasing and feels more comfortable.  "

## 2024-01-29 NOTE — MEDICATION SCRIBE - ADMISSION MEDICATION HISTORY
Medication Scribe Admission Medication History    Admission medication history is complete. The information provided in this note is only as accurate as the sources available at the time of the update.    Information Source(s): Patient and CareEverywhere/SureScripts via  with patient in room and finished at desk.    Pertinent Information: Patient finished a script for Cephalexin this past month.  Also had some Norco and Tamsulosin that he finished at the beginning of this month.  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  States that he is having a green light procedure on 2/13/24.    Changes made to PTA medication list:  Added: Clonazepam 0.5 mg, Lansoprazole 30 mg, MVI tab.  Deleted: Esomeprazole PO.  Changed: None    Allergies reviewed with patient and updates made in EHR: yes, no change.  He also states that mood medicines do not work for him.  He took something (? Name of it) after his father had passed away.    Medication History Completed By: Pricila Cody 1/29/2024 1:58 PM    PTA Med List   Medication Sig Last Dose    clonazePAM (KLONOPIN) 0.5 MG tablet Take 0.25-0.5 mg by mouth daily as needed for anxiety or agitation 1/29/2024 at am    LANsoprazole (PREVACID) 30 MG DR capsule Take 30 mg by mouth 2 times daily 1/29/2024 at am    Multiple Vitamin (ONE-A-DAY ESSENTIAL) TABS Take 1 tablet by mouth daily 1/29/2024 at am

## 2024-01-30 ENCOUNTER — APPOINTMENT (OUTPATIENT)
Dept: PHYSICAL THERAPY | Facility: CLINIC | Age: 79
DRG: 698 | End: 2024-01-30
Payer: COMMERCIAL

## 2024-01-30 ENCOUNTER — APPOINTMENT (OUTPATIENT)
Dept: SPEECH THERAPY | Facility: CLINIC | Age: 79
DRG: 698 | End: 2024-01-30
Payer: COMMERCIAL

## 2024-01-30 LAB
ALBUMIN SERPL BCG-MCNC: 3.2 G/DL (ref 3.5–5.2)
ALP SERPL-CCNC: 77 U/L (ref 40–150)
ALT SERPL W P-5'-P-CCNC: 8 U/L (ref 0–70)
ANION GAP SERPL CALCULATED.3IONS-SCNC: 13 MMOL/L (ref 7–15)
AST SERPL W P-5'-P-CCNC: 16 U/L (ref 0–45)
BACTERIA UR CULT: ABNORMAL
BACTERIA UR CULT: ABNORMAL
BILIRUB SERPL-MCNC: 1.5 MG/DL
BUN SERPL-MCNC: 10.2 MG/DL (ref 8–23)
CALCIUM SERPL-MCNC: 9.2 MG/DL (ref 8.8–10.2)
CHLORIDE SERPL-SCNC: 106 MMOL/L (ref 98–107)
CREAT SERPL-MCNC: 0.8 MG/DL (ref 0.67–1.17)
DEPRECATED HCO3 PLAS-SCNC: 20 MMOL/L (ref 22–29)
EGFRCR SERPLBLD CKD-EPI 2021: >90 ML/MIN/1.73M2
ERYTHROCYTE [DISTWIDTH] IN BLOOD BY AUTOMATED COUNT: 13.9 % (ref 10–15)
GLUCOSE SERPL-MCNC: 110 MG/DL (ref 70–99)
HCT VFR BLD AUTO: 34.4 % (ref 40–53)
HGB BLD-MCNC: 11.3 G/DL (ref 13.3–17.7)
MCH RBC QN AUTO: 32.2 PG (ref 26.5–33)
MCHC RBC AUTO-ENTMCNC: 32.8 G/DL (ref 31.5–36.5)
MCV RBC AUTO: 98 FL (ref 78–100)
PLATELET # BLD AUTO: 215 10E3/UL (ref 150–450)
POTASSIUM SERPL-SCNC: 3.8 MMOL/L (ref 3.4–5.3)
PROT SERPL-MCNC: 6.2 G/DL (ref 6.4–8.3)
RBC # BLD AUTO: 3.51 10E6/UL (ref 4.4–5.9)
SODIUM SERPL-SCNC: 139 MMOL/L (ref 135–145)
WBC # BLD AUTO: 10.2 10E3/UL (ref 4–11)

## 2024-01-30 PROCEDURE — 99232 SBSQ HOSP IP/OBS MODERATE 35: CPT | Performed by: STUDENT IN AN ORGANIZED HEALTH CARE EDUCATION/TRAINING PROGRAM

## 2024-01-30 PROCEDURE — 999N000111 HC STATISTIC OT IP EVAL DEFER

## 2024-01-30 PROCEDURE — 92610 EVALUATE SWALLOWING FUNCTION: CPT | Mod: GN | Performed by: SPEECH-LANGUAGE PATHOLOGIST

## 2024-01-30 PROCEDURE — 258N000003 HC RX IP 258 OP 636

## 2024-01-30 PROCEDURE — 80053 COMPREHEN METABOLIC PANEL: CPT

## 2024-01-30 PROCEDURE — 36415 COLL VENOUS BLD VENIPUNCTURE: CPT

## 2024-01-30 PROCEDURE — 97161 PT EVAL LOW COMPLEX 20 MIN: CPT | Mod: GP

## 2024-01-30 PROCEDURE — 120N000001 HC R&B MED SURG/OB

## 2024-01-30 PROCEDURE — 97530 THERAPEUTIC ACTIVITIES: CPT | Mod: GP

## 2024-01-30 PROCEDURE — 250N000011 HC RX IP 250 OP 636

## 2024-01-30 PROCEDURE — 85027 COMPLETE CBC AUTOMATED: CPT

## 2024-01-30 PROCEDURE — 250N000013 HC RX MED GY IP 250 OP 250 PS 637

## 2024-01-30 PROCEDURE — 250N000013 HC RX MED GY IP 250 OP 250 PS 637: Performed by: STUDENT IN AN ORGANIZED HEALTH CARE EDUCATION/TRAINING PROGRAM

## 2024-01-30 RX ORDER — CLONAZEPAM 0.25 MG/1
0.25 TABLET, ORALLY DISINTEGRATING ORAL DAILY PRN
Status: DISCONTINUED | OUTPATIENT
Start: 2024-01-30 | End: 2024-01-31 | Stop reason: HOSPADM

## 2024-01-30 RX ORDER — CLONAZEPAM 0.5 MG/1
0.5 TABLET ORAL DAILY PRN
Status: DISCONTINUED | OUTPATIENT
Start: 2024-01-30 | End: 2024-01-31 | Stop reason: HOSPADM

## 2024-01-30 RX ADMIN — SODIUM CHLORIDE, POTASSIUM CHLORIDE, SODIUM LACTATE AND CALCIUM CHLORIDE 1000 ML: 600; 310; 30; 20 INJECTION, SOLUTION INTRAVENOUS at 16:03

## 2024-01-30 RX ADMIN — PANTOPRAZOLE SODIUM 40 MG: 40 TABLET, DELAYED RELEASE ORAL at 08:43

## 2024-01-30 RX ADMIN — CEFTRIAXONE SODIUM 2 G: 2 INJECTION, POWDER, FOR SOLUTION INTRAMUSCULAR; INTRAVENOUS at 10:49

## 2024-01-30 RX ADMIN — ONDANSETRON 4 MG: 2 INJECTION INTRAMUSCULAR; INTRAVENOUS at 06:25

## 2024-01-30 RX ADMIN — SODIUM CHLORIDE, POTASSIUM CHLORIDE, SODIUM LACTATE AND CALCIUM CHLORIDE 1000 ML: 600; 310; 30; 20 INJECTION, SOLUTION INTRAVENOUS at 03:09

## 2024-01-30 RX ADMIN — CLONAZEPAM 0.5 MG: 0.5 TABLET ORAL at 06:30

## 2024-01-30 ASSESSMENT — ACTIVITIES OF DAILY LIVING (ADL)
ADLS_ACUITY_SCORE: 26

## 2024-01-30 NOTE — PLAN OF CARE
OT: Per discussion with physical therapy no IP OT needs identified. Will discontinue OT orders at this time.

## 2024-01-30 NOTE — PROGRESS NOTES
[facility]    Medicine Progress Note - Hospitalist Service    Date of Admission:  1/29/2024    Assessment & Plan   Jerry Jay is a 78 year old male with a medical history of Obstructive uropathy s/p bilateral renal stent placement, GERD, nephrolithiasis, complicated UTI, right adrenal mass who presents on 1/29/2024 with abdominal pain. He is found to have acute pyelonephritis and is being admitted for management.     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 Presents with abdominal pain & clogged catheter x1day.  Astorga exchanged in ER & now draining.    Septic based on leukocytosis (13.4), tachycardia (128bpm). UA after switching astorga catheter appears obviuosly infected (cloudy, positive nitrite, small blood, large leuk esterase with >182 WBC, 37 RBC, mucus. 2 transitional epi cells). CT shows mild to moderate bilateral hydroureteronephrosis to ureterovesical junctions, non-obstructing calculi bilaterally, high density urinary bladder fluid & stranding probably blood or sequelae of infection, periureteral & perinephric inflammation. Initiated on ceftriaxone 2g in ER.   Ucx; gram negative bacilli    -Continue ceftriaxone 2g IV Q24hrs  -Lactated ringer 100/hr continuous to support renal function in setting of possible post-obstructive diuresis (if has >5L urine in a day would replace 1 L ivf for every 1 L of Urine output OVER 5L)  -Measure urine output  -Blood cultures x2 1/29 NGTD  -Continue outpatient follow up with urology for ongoing prep for planned TURP 2/13/24.     Failure to thrive  Patient presented to ER covered in stool, and per EMS report appeared to be having difficulty adequately providing sanitary astorga cares for  himself at home. This likely contributed to acute infection, & patient may benefit from sdditional help at home or placement for adequate cares.     -PT & OT consults  -Care management consult for safe disposition planning    Renal calculi  Multiple non-obstructing stones bilaterally on CT imaging 1/29, as well as 0.4cm non-obstructing calculus in left distal ureter. Patient is s/p bilateral ureteral stents with removal on 12/28/23 for history of obstructing stones.     -Monitor, continue outpatient follow up for ongoing management. No acute interventions warranted for non-obstructing calculi.     Anxiety  Noted in history, managed PTA with clonazepam 0.25-0.5mg daily PRN for anxiety.,   -Continue PTA clonazepam, use judiciously    GERD  Endorses typical symptoms present on admission. Managed PTA with lansoprazole 30mg BID, continue.     Dysphagia  Endorses difficulty swallowing at baseline and follows soft diet. Per chart review has documented history of dysphagia on swallowing study. Prefers mac n cheese and eggs. Does not sound like he is thickening liquids but per chart review he is supposed to do this due to aspiration with thin & honey consistencies.   -Minced & moist diet  -Swallow consult to assist with proper thickening requirements          Diet: Minced & Moist Diet (level 5) Thin Liquids (level 0)    DVT Prophylaxis: Low Risk/Ambulatory with no VTE prophylaxis indicated  Hendricks Catheter: PRESENT, indication: Obstruction;Retention  Lines: None     Cardiac Monitoring: None  Code Status: Full Code      Clinically Significant Risk Factors Present on Admission           # Hypercalcemia: Highest Ca = 10.3 mg/dL in last 2 days, will monitor as appropriate    # Hypoalbuminemia: Lowest albumin = 3.2 g/dL at 1/30/2024  5:34 AM, will monitor as appropriate                     Disposition Plan     Expected Discharge Date: 01/31/2024      Destination: home              Geo Jimenez DO  Hospitalist Service  [facility]  [facility]  Securely message with Urmila (more info)  Text page via [employer] Paging/Directory   ______________________________________________________________________    Interval History   NAEO.    ~4L UOP overnight.      Physical Exam   Vital Signs: Temp: 98.8  F (37.1  C) Temp src: Oral BP: 125/75 Pulse: 85   Resp: 20 SpO2: 97 % O2 Device: None (Room air)    Weight: 190 lbs 0 oz    Physical Exam  HENT:      Head: Normocephalic and atraumatic.      Comments: Mass on top of head (patient states its a lipoma)     Nose: Nose normal.      Mouth/Throat:      Mouth: Mucous membranes are moist.   Eyes:      General: No scleral icterus.  Cardiovascular:      Rate and Rhythm: Normal rate and regular rhythm.      Heart sounds: No murmur heard.  Pulmonary:      Effort: Pulmonary effort is normal.      Breath sounds: No rales.   Abdominal:      General: Abdomen is flat. Bowel sounds are normal. There is distension.      Palpations: Abdomen is soft.      Tenderness: There is no abdominal tenderness. There is no right CVA tenderness or left CVA tenderness.   Musculoskeletal:      Right lower leg: Edema present.      Left lower leg: Edema present.   Skin:     Capillary Refill: Capillary refill takes 2 to 3 seconds.      Findings: No bruising.   Neurological:      General: No focal deficit present.           Medical Decision Making       44 MINUTES SPENT BY ME on the date of service doing chart review, history, exam, documentation & further activities per the note.      Data     I have personally reviewed the following data over the past 24 hrs:    10.2  \   11.3 (L)   / 215     139 106 10.2 /  110 (H)   3.8 20 (L) 0.80 \     ALT: 8 AST: 16 AP: 77 TBILI: 1.5 (H)   ALB: 3.2 (L) TOT PROTEIN: 6.2 (L) LIPASE: N/A     Procal: N/A CRP: N/A Lactic Acid: 1.3         Imaging results reviewed over the past 24 hrs:   No results found for this or any previous visit (from the past 24 hour(s)).

## 2024-01-30 NOTE — CONSULTS
Care Management:    Received a Consult for discharge planning.  The Patient lives alone independently in the community.    The Patient is admitted with complications with his Astorga catheter.  He has a laser TURP scheduled for the middle of February.  Currently, he is utilizing the catheter for lower bladder outlet obstruction secondary to benign prostatic hypertrophy.     Discussed home care for assistance with astorga if needed.  Patient declined, he has been caring for the astorga catheter for 2 months and does not feel he needs home care.    There are no discharge needs anticipated.      Chelsea Ramos RN, Care Coordinator [phone]

## 2024-01-30 NOTE — PROGRESS NOTES
01/30/24 1034   Appointment Info   Signing Clinician's Name / Credentials (PT) Cheryl Patel, PT   Living Environment   People in Home alone   Current Living Arrangements house   Home Accessibility stairs to enter home   Number of Stairs, Main Entrance 2   Stair Railings, Main Entrance railings safe and in good condition   Living Environment Comments all needs met on main level   Self-Care   Equipment Currently Used at Home none   Fall history within last six months no   Activity/Exercise/Self-Care Comment indep with mobility without device, ADL's/IADL's, has friends who support as needed. states he has had catheter x2 months   General Information   Onset of Illness/Injury or Date of Surgery 01/29/24   Referring Physician Radha Ortiz PA-C   Patient/Family Therapy Goals Statement (PT) return home today   Pertinent History of Current Problem (include personal factors and/or comorbidities that impact the POC) Jerry Jay is a 78 year old male with a medical history of Obstructive uropathy s/p bilateral renal stent placement, GERD, nephrolithiasis, complicated UTI, right adrenal mass who presents on 1/29/2024 with abdominal pain. He is found to have acute pyelonephritis and is being admitted for management. Pt stating has had catheter at home x2 months.   Cognition   Affect/Mental Status (Cognition) WFL   Orientation Status (Cognition) oriented x 4   Follows Commands (Cognition) WFL   Pain Assessment   Patient Currently in Pain No   Range of Motion (ROM)   Range of Motion ROM is WFL   Strength (Manual Muscle Testing)   Strength (Manual Muscle Testing) No deficits observed during functional mobility   Bed Mobility   Comment, (Bed Mobility) supine<>sit with indep   Transfers   Comment, (Transfers) sit>stand from EOB without device and indep   Gait/Stairs (Locomotion)   [place] Level (Gait) supervision   Assistive Device (Gait)   (no device)   Distance in Feet (Gait) 200   Pattern (Gait) step-through    Deviations/Abnormal Patterns (Gait) gait speed decreased   Comment, (Gait/Stairs) good balance without device, no concerns with stairs   Balance   Balance no deficits were identified   Clinical Impression   Criteria for Skilled Therapeutic Intervention Yes, treatment indicated   PT Diagnosis (PT) impaired functional mobility   Influenced by the following impairments reduced activity tolerance   Functional limitations due to impairments impaired gait   Clinical Presentation (PT Evaluation Complexity) stable   Clinical Presentation Rationale clinical reasoning   Clinical Decision Making (Complexity) low complexity   Planned Therapy Interventions (PT) gait training;patient/family education;progressive activity/exercise;risk factor education;home program guidelines   Risk & Benefits of therapy have been explained evaluation/treatment results reviewed;care plan/treatment goals reviewed;risks/benefits reviewed;current/potential barriers reviewed;participants included;participants voiced agreement with care plan;patient   PT Total Evaluation Time   PT Eval, Low Complexity Minutes (61510) 10   Physical Therapy Goals   PT Frequency One time eval and treatment only   PT Predicted Duration/Target Date for Goal Attainment 01/30/24   PT Goals Gait   PT: Gait Supervision/stand-by assist;Greater than 200 feet;Goal Met   Interventions   Interventions Quick Adds Therapeutic Activity   Therapeutic Activity   Therapeutic Activities: dynamic activities to improve functional performance Minutes (40062) 8   Symptoms Noted During/After Treatment None   Treatment Detail/Skilled Intervention edu on continued mobility during hospitalization to prevent decline in function, pt able to ambulate with supervision/indep and no device. discussed rec for return home, pt in agreement and hoping to return home today. remans in bed at end of session, bed alarm on and call light in reach.   PT Discharge Planning   PT Plan d/c PT, no needs   PT  Discharge Recommendation (DC Rec) home with assist   PT Rationale for DC Rec ppt presenting at baseline mobility level and hopes to return home today, no further IP therapy needs and pt is safe to return home once medically stable   PT Brief overview of current status amb 200 feet without device and Monica/indep   PT Equipment Needed at Discharge   (no needs)   Total Session Time   Timed Code Treatment Minutes 8   Total Session Time (sum of timed and untimed services) 18

## 2024-01-30 NOTE — PLAN OF CARE
Problem: Risk for Delirium  Goal: Improved Sleep  Outcome: Progressing     Problem: Risk for Delirium  Goal: Optimal Coping  Outcome: Met  Goal: Improved Behavioral Control  Outcome: Met  Intervention: Minimize Safety Risk  Recent Flowsheet Documentation  Taken 1/30/2024 0848 by Venus Strong RN  Enhanced Safety Measures: room near unit station  Goal: Improved Attention and Thought Clarity  Outcome: Met   Goal Outcome Evaluation:       Pt A & O x 4, pain 3-4/10 in hips from being in bed, on room air, able to make needs known, uses light, SBA did not sleep well on NOC, anxiety about health, PIV infusing   ml/h with intermittent antibiotics, astorga cares completed, soft minced moist diet,

## 2024-01-30 NOTE — PROGRESS NOTES
Patient requested for something to help with anxiety. Has PRN clonazepam, given. PRN zofran given for nausea and vomiting. Patient expresses still unable to clear throat of saliva or eat, hopes to be able to eat and keep food down. Patient hoping to discharge home today.     Thony Keller RN

## 2024-01-30 NOTE — PLAN OF CARE
"  Problem: Adult Inpatient Plan of Care  Goal: Plan of Care Review  Description: Ongoing.  Outcome: Progressing     Problem: Adult Inpatient Plan of Care  Goal: Patient-Specific Goal (Individualized)  Description: Developing plan.  Outcome: Progressing     Problem: Adult Inpatient Plan of Care  Goal: Absence of Hospital-Acquired Illness or Injury  Outcome: Progressing    Problem: Adult Inpatient Plan of Care  Goal: Optimal Comfort and Wellbeing  Outcome: Progressing     Problem: Adult Inpatient Plan of Care  Goal: Readiness for Transition of Care  Outcome: Progressing       Outcome Evaluation: Progressing.    Patient alert and oriented. SBA. Uses call light for assistance. Patient had productive cough throughout shift. Patient states this has been going on for three months because of his kidney and the urinary catheter placement. He states that often the acid reflux goes up into his sinuses and plugs up his ears, which worsen the symptoms. Patient denies having pain or discomfort other than frequent cough. Continues on LR @ 100 ml/hr. Held senna at HS, pt states \"I already have normal bowel movements, I don't want to start having loose stools taking too much\". LS diminished. O2 sat 95% RA, and afebrile.     Thony Keller RN    "

## 2024-01-30 NOTE — PROGRESS NOTES
Impression: Pt is edentulous, reporting he has dentures but doesn t wear them. He has a previous VFSS from 2016 recommending soft and bite sized diet and extremely thick liquids with silent aspiration noted. He denies extensive swallowing therapy or further evaluation since. He does not thicken his liquids and eats soft foods (eggs and mac and cheese go well for him). He reports esophageal issues and that if he drinks too fast he throws up. He has reflux and thick phlegm in his throat. He denies unintentional weight loss or pneumonia over the past 8 years. He is educated on the results of his 2016 study and the risks of aspiration, silent aspiration, the diet recommendations from 2016 and free water protocol. He reports he is agreeable to a repeat VFSS later in February of this year as his kidney issues are his priority right now and he has not been having difficulty with pneumonias or other symptoms of aspiration over the past few years. PO trial of thin liquid result in no change to vocal quality, no cough, and no other overt s/s of aspiration. Plan is to continue with soft and bite sized foods and thin liquids utilizing safe swallowing strategies, such as sitting upright for intake, going slow and monitoring for aspiration symptoms. Recommend outpatient VFSS.      Bedside Swallow Evaluation  01/30/24   Appointment Info   Signing Clinician's Name / Credentials (SLP) Sally Ruelas MA, CCC/SLP   General Information   Onset of Illness/Injury or Date of Surgery 01/29/24   Referring Physician Radha Ortiz PA-C   Patient/Family Therapy Goal Statement (SLP) to address his issues with his kidney stones and catheter   Pertinent History of Current Problem Jerry Jay is a 78 year old male with a medical history of Obstructive uropathy s/p bilateral renal stent placement, GERD, nephrolithiasis, complicated UTI, right adrenal mass who presents on 1/29/2024 with abdominal pain. He is found to have acute  pyelonephritis and is being admitted for management. Pt stating has had catheter at home x2 months. Speech Therapy consult ordered as Pt has a history of dysphagia with a VFSS in 2016 which revealed silent aspiration of thin to moderately thick liquids Recommendation was soft & bite sized diet and extremely thick liquids. Pt denies weight loss or pneumonia over the past 5 years. He does report esophageal issues and LPR. He eats mac and cheese and scrambled eggs as they go down well.   General Observations Pt is upright in bed for evaluation.   Type of Evaluation   Type of Evaluation Swallow Evaluation   Oral Motor   Oral Musculature generally intact   Dentition (Oral Motor)   Dentition (Oral Motor) edentulous  (reports has dentures but doesn't use them)   Facial Symmetry (Oral Motor)   Facial Symmetry (Oral Motor) WNL   Lip Function (Oral Motor)   Lip Range of Motion (Oral Motor) WNL   Tongue Function (Oral Motor)   Tongue ROM (Oral Motor) WNL   Jaw Function (Oral Motor)   Jaw Function (Oral Motor) WNL   Cough/Swallow/Gag Reflex (Oral Motor)   Soft Palate/Velum (Oral Motor) WNL   Volitional Throat Clear/Cough (Oral Motor) WNL   Volitional Swallow (Oral Motor) WNL   Vocal Quality/Secretion Management (Oral Motor)   Vocal Quality (Oral Motor) WNL   Secretion Management (Oral Motor)   (reports a lot of phlegm)   General Swallowing Observations   Past History of Dysphagia Pt has a history of dysphagia with a vfss in 2016 recommending soft and bite sized diet with extremely thick liquids and free water protocol. Pt report no extensive swallow therapy. He has esophageal issues and says meats get stuck in his throat. He has a history of silent aspiration. He denies difficulty with liquids over the past 8 years.   Respiratory Support room air   Current Diet/Method of Nutritional Intake (General Swallowing Observations, NIS) thin liquids (level 0);minced and moist (dysphagia mechanically altered) (level 5)   Swallowing  Evaluation Clinical swallow evaluation   Clinical Swallow Evaluation   Feeding Assistance no assistance needed   Clinical Swallow Evaluation Textures Trialed thin liquids   Clinical Swallow Eval: Thin Liquid Texture Trial   Mode of Presentation, Thin Liquids straw;self-fed   Volume of Liquid or Food Presented 1 sip   Oral Phase of Swallow WFL   Pharyngeal Phase of Swallow intact;other (see comments)  (though history of silent aspiration)   Diagnostic Statement no change in vocal quality, no cough, pt reports feeling swallowing liquids goes okay. Hx of silent aspiration.   Esophageal Phase of Swallow   Patient reports or presents with symptoms of esophageal dysphagia Yes   Swallowing Recommendations   Diet Consistency Recommendations thin liquids (level 0);soft & bite-sized (level 6)   Supervision Level for Intake distant supervision needed   Mode of Delivery Recommendations bolus size, small;slow rate of intake;food moistened   Swallowing Maneuver Recommendations throat clear-swallow   Monitoring/Assistance Required (Eating/Swallowing) monitor for cough or change in vocal quality with intake   Recommended Feeding/Eating Techniques (Swallow Eval) maintain upright sitting position for eating;maintain upright posture during/after eating for 30 minutes   Comment, Swallowing Recommendations Pt is edentulous, reporting he has dentures but doesn t wear them. He has a previous VFSS from 2016 recommending soft and bite sized diet and extremely thick liquids with silent aspiration noted. He denies extensive swallowing therapy or further evaluation since. He does not thicken his liquids and eats soft foods (eggs and mac and cheese go well for him). He reports esophageal issues and that if he drinks too fast he throws up. He has reflux and thick phlegm in his throat. He denies unintentional weight loss or pneumonia over the past 8 years. He is educated on the results of his 2016 study and the risks of aspiration, silent  aspiration, the diet recommendations from 2016 and free water protocol. He reports he is agreeable to a repeat VFSS later in February of this year as his kidney issues are his priority right now and he has not been having difficulty with pneumonias or other symptoms of aspiration over the past few years. PO trial of thin liquid result in no change to vocal quality, no cough, and no other overt s/s of aspiration. Plan is to continue with soft and bite sized foods and thin liquids utilizing safe swallowing strategies, such as sitting upright for intake, going slow and monitoring for aspiration symptoms. Recommend outpatient VFSS.   Clinical Impression   Criteria for Skilled Therapeutic Interventions Met (SLP Eval) Evaluation only;Other (see comments)  (Agreeable to further evaluation as an outpatient after discharge.)   SLP Diagnosis dysphagia   Activity Limitations Related to Problem List (SLP) Pt has documentation of silent aspiration putting him at risk for pneumonias but reports no issues with liquids or aspiration over the past 8 years.   Risks & Benefits of therapy have been explained evaluation/treatment results reviewed;care plan/treatment goals reviewed;risks/benefits reviewed;current/potential barriers reviewed;participants voiced agreement with care plan;participants included;patient   SLP Total Evaluation Time   Eval: oral/pharyngeal swallow function, clinical swallow Minutes (36518) 20   Total Session Time   Total Session Time (sum of timed and untimed services) 20

## 2024-01-31 ENCOUNTER — TELEPHONE (OUTPATIENT)
Dept: FAMILY MEDICINE | Facility: CLINIC | Age: 79
End: 2024-01-31
Payer: COMMERCIAL

## 2024-01-31 VITALS
SYSTOLIC BLOOD PRESSURE: 147 MMHG | OXYGEN SATURATION: 96 % | RESPIRATION RATE: 20 BRPM | DIASTOLIC BLOOD PRESSURE: 80 MMHG | HEART RATE: 85 BPM | TEMPERATURE: 98.8 F | WEIGHT: 190 LBS

## 2024-01-31 LAB
ACINETOBACTER SPECIES: NOT DETECTED
CITROBACTER SPECIES: NOT DETECTED
CTX-M: NOT DETECTED
ENTEROBACTER SPECIES: NOT DETECTED
ESCHERICHIA COLI: NOT DETECTED
IMP: NOT DETECTED
KLEBSIELLA OXYTOCA: NOT DETECTED
KLEBSIELLA PNEUMONIAE: NOT DETECTED
KPC: NOT DETECTED
NDM: NOT DETECTED
OXA (DETECTED/NOT DETECTED): NOT DETECTED
PROTEUS SPECIES: DETECTED
PSEUDOMONAS AERUGINOSA: NOT DETECTED
VIM: NOT DETECTED

## 2024-01-31 PROCEDURE — 250N000013 HC RX MED GY IP 250 OP 250 PS 637: Performed by: STUDENT IN AN ORGANIZED HEALTH CARE EDUCATION/TRAINING PROGRAM

## 2024-01-31 PROCEDURE — 258N000003 HC RX IP 258 OP 636

## 2024-01-31 PROCEDURE — 250N000013 HC RX MED GY IP 250 OP 250 PS 637

## 2024-01-31 PROCEDURE — 250N000011 HC RX IP 250 OP 636: Performed by: STUDENT IN AN ORGANIZED HEALTH CARE EDUCATION/TRAINING PROGRAM

## 2024-01-31 PROCEDURE — 99239 HOSP IP/OBS DSCHRG MGMT >30: CPT | Performed by: STUDENT IN AN ORGANIZED HEALTH CARE EDUCATION/TRAINING PROGRAM

## 2024-01-31 PROCEDURE — 258N000003 HC RX IP 258 OP 636: Performed by: STUDENT IN AN ORGANIZED HEALTH CARE EDUCATION/TRAINING PROGRAM

## 2024-01-31 RX ORDER — SODIUM CHLORIDE, SODIUM LACTATE, POTASSIUM CHLORIDE, CALCIUM CHLORIDE 600; 310; 30; 20 MG/100ML; MG/100ML; MG/100ML; MG/100ML
1000 INJECTION, SOLUTION INTRAVENOUS CONTINUOUS
Status: DISCONTINUED | OUTPATIENT
Start: 2024-01-31 | End: 2024-01-31 | Stop reason: HOSPADM

## 2024-01-31 RX ORDER — CEFDINIR 300 MG/1
300 CAPSULE ORAL EVERY 12 HOURS
Qty: 14 CAPSULE | Refills: 0 | Status: SHIPPED | OUTPATIENT
Start: 2024-02-01

## 2024-01-31 RX ORDER — CEFDINIR 300 MG/1
300 CAPSULE ORAL EVERY 12 HOURS SCHEDULED
Status: DISCONTINUED | OUTPATIENT
Start: 2024-02-01 | End: 2024-01-31 | Stop reason: HOSPADM

## 2024-01-31 RX ORDER — CEFPODOXIME PROXETIL 200 MG/1
200 TABLET, FILM COATED ORAL 2 TIMES DAILY
Status: DISCONTINUED | OUTPATIENT
Start: 2024-02-01 | End: 2024-01-31

## 2024-01-31 RX ORDER — CEFTRIAXONE 2 G/1
2 INJECTION, POWDER, FOR SOLUTION INTRAMUSCULAR; INTRAVENOUS EVERY 24 HOURS
Status: DISCONTINUED | OUTPATIENT
Start: 2024-01-31 | End: 2024-01-31 | Stop reason: HOSPADM

## 2024-01-31 RX ADMIN — CEFTRIAXONE SODIUM 2 G: 2 INJECTION, POWDER, FOR SOLUTION INTRAMUSCULAR; INTRAVENOUS at 10:02

## 2024-01-31 RX ADMIN — CLONAZEPAM 0.5 MG: 0.5 TABLET ORAL at 06:31

## 2024-01-31 RX ADMIN — SODIUM CHLORIDE, POTASSIUM CHLORIDE, SODIUM LACTATE AND CALCIUM CHLORIDE 1000 ML: 600; 310; 30; 20 INJECTION, SOLUTION INTRAVENOUS at 00:19

## 2024-01-31 RX ADMIN — SODIUM CHLORIDE, POTASSIUM CHLORIDE, SODIUM LACTATE AND CALCIUM CHLORIDE 1000 ML: 600; 310; 30; 20 INJECTION, SOLUTION INTRAVENOUS at 10:53

## 2024-01-31 RX ADMIN — PANTOPRAZOLE SODIUM 40 MG: 40 TABLET, DELAYED RELEASE ORAL at 06:31

## 2024-01-31 ASSESSMENT — ACTIVITIES OF DAILY LIVING (ADL)
ADLS_ACUITY_SCORE: 26

## 2024-01-31 NOTE — PROGRESS NOTES
[facility] DISCHARGE NOTE    Patient discharged to home at 12:30PM via wheel chair. Accompanied by staff. Discharge instructions reviewed with patient, opportunity offered to ask questions. Prescriptions sent to patients preferred pharmacy. All belongings sent with patient.    Venus Strong RN

## 2024-01-31 NOTE — DISCHARGE SUMMARY
[facility]  Hospitalist Discharge Summary      Date of Admission:  1/29/2024  Date of Discharge:  1/31/2024  Discharging Provider: Geo Jimenez DO  Discharge Service: Hospitalist Service    Discharge Diagnoses   Jerry Jay is a 78 year old male with a medical history of Obstructive uropathy s/p bilateral renal stent placement, GERD, nephrolithiasis, complicated UTI, right adrenal mass who presents on 1/29/2024 with abdominal pain. He is found to have acute pyelonephritis and is being admitted for management.     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  The following labs/tests are recommended: chemistry   panel 2 weeks.            Unresulted Labs Ordered in the Past 30 Days of this Admission       Date and Time Order Name Status Description    1/29/2024 10:55 AM Blood Culture Peripheral Blood Preliminary     1/29/2024 10:55 AM Blood Culture Peripheral Blood Preliminary         These results will be followed up by PCP or myself Dr. Jimenez    Discharge Disposition   Discharged to home  Condition at discharge: Fair    Hospital Course   Jerry Jay is a 78 year old male with a medical history of Obstructive uropathy s/p bilateral renal stent placement, GERD, nephrolithiasis, complicated UTI, right adrenal mass who presents on  1/29/2024 with abdominal pain. He is found to have acute pyelonephritis and is being admitted for management.     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 Presents with abdominal pain & clogged catheter x1day.  Astorga exchanged in ER & now draining.    Septic based on leukocytosis (13.4), tachycardia (128bpm). UA after switching astorga catheter appears obviuosly infected (cloudy, positive nitrite, small blood, large leuk esterase with >182 WBC, 37 RBC, mucus. 2 transitional epi cells). CT shows mild to moderate bilateral hydroureteronephrosis to ureterovesical junctions, non-obstructing calculi bilaterally, high density urinary bladder fluid & stranding probably blood or sequelae of infection, periureteral & perinephric inflammation. Initiated on ceftriaxone 2g in ER.   Ucx; gram negative bacilli    -Continue ceftriaxone 2g IV Q24hrs  -Lactated ringer 100/hr continuous to support renal function in setting of possible post-obstructive diuresis (if has >5L urine in a day would replace 1 L ivf for every 1 L of Urine output OVER 5L)  -Measure urine output  -Blood cultures x2 1/29 NGTD  -Continue outpatient follow up with urology for ongoing prep for planned TURP 2/13/24.     Failure to thrive  Patient presented to ER covered in stool, and per EMS report appeared to be having difficulty adequately providing sanitary astorga cares for himself at home. This likely contributed to acute infection, & patient may benefit from sdditional help at home or placement for adequate cares.     -PT & OT consults  -Care management consult for safe disposition planning    Renal calculi  Multiple non-obstructing stones bilaterally on CT imaging 1/29, as well as 0.4cm non-obstructing  calculus in left distal ureter. Patient is s/p bilateral ureteral stents with removal on 12/28/23 for history of obstructing stones.     -Monitor, continue outpatient follow up for ongoing management. No acute interventions warranted for non-obstructing calculi.     Anxiety  Noted in history, managed PTA with clonazepam 0.25-0.5mg daily PRN for anxiety.,   -Continue PTA clonazepam, use judiciously    GERD  Endorses typical symptoms present on admission. Managed PTA with lansoprazole 30mg BID, continue.     Dysphagia  Endorses difficulty swallowing at baseline and follows soft diet. Per chart review has documented history of dysphagia on swallowing study. Prefers mac n cheese and eggs. Does not sound like he is thickening liquids but per chart review he is supposed to do this due to aspiration with thin & honey consistencies.   -Minced & moist diet  -Swallow consult to assist with proper thickening requirements    Consultations This Hospital Stay   OCCUPATIONAL THERAPY ADULT IP CONSULT  PHYSICAL THERAPY ADULT IP CONSULT  CARE MANAGEMENT / SOCIAL WORK IP CONSULT  SPEECH LANGUAGE PATH ADULT IP CONSULT    Code Status   Full Code    Time Spent on this Encounter   IGeo DO, personally saw the patient today and spent greater than 30 minutes discharging this patient.       Goe Jimenez DO  [facility]  [address] [address]  Phone: [phone]  Fax: [phone]  ______________________________________________________________________    Physical Exam   Vital Signs: Temp: 98  F (36.7  C) Temp src: Oral BP: 135/80 Pulse: 80   Resp: 20 SpO2: 96 % O2 Device: None (Room air)    Weight: 190 lbs 0 oz  Physical Exam  Constitutional:       General: Pt is not in acute distress.  HENT:      Head: Normocephalic and atraumatic.      Nose: Nose normal.   Eyes:      Conjunctiva/sclera: Conjunctivae normal.   Pulmonary:      Effort: Pulmonary effort is normal.   Abdominal:       General: Abdomen is flat.   Skin:     Findings: No rash.   Neurological:      General: No focal deficit present.      Mental Status: He is alert.   Psychiatric:         Mood and Affect: Mood normal.          Primary Care Physician   Layne Pedraza    Discharge Orders      Reason for your hospital stay    You were hospitalized for urinary obstruction due to clogging of your indwelling astorga catheter and an urinary tract infection. After replacement of your astorga with resolution of obstruction, and after identification of the bacteria in your urine, you were discharged home with antibiotics and the recommendation to follow up with your PCP and urologist.     Follow-up and recommended labs and tests     Follow up with primary care provider, Layne Pedraza, within 7 days for hospital follow- up.  The following labs/tests are recommended: chemistry panel 2 weeks.     Activity    Your activity upon discharge: activity as tolerated     Diet    Follow this diet upon discharge: Orders Placed This Encounter      Minced & Moist Diet (level 5) Thin Liquids (level 0)       Significant Results and Procedures   Results for orders placed or performed during the hospital encounter of 01/29/24   CT Abdomen Pelvis w/o Contrast    Narrative    CT ABDOMEN/PELVIS WITHOUT CONTRAST January 29, 2024 11:51 AM    CLINICAL HISTORY: Rule out ureteral obstructive process.     TECHNIQUE: CT scan of the abdomen and pelvis was performed without IV  contrast. Multiplanar reformats were obtained. Dose reduction  techniques were used.  CONTRAST: None.    COMPARISON: None available.    FINDINGS:   LOWER CHEST: Trace right greater than left pleural effusions and  adjacent atelectasis.    HEPATOBILIARY: No significant mass or bile duct dilatation. No  calcified gallstones.     PANCREAS: No significant mass, duct dilatation, or inflammatory  change.    SPLEEN: Unremarkable.    ADRENAL GLANDS: Low-attenuation right adrenal gland thickening.  Posterior and  separate to the right adrenal gland is a 1.3 x 2.2 cm  low-attenuation nodule (5/40).    KIDNEYS: Bilateral mild to moderate hydroureteronephrosis to the  ureterovesical junctions. Bilateral mild periureteral and perinephric  fat stranding. Nonobstructing left distal 0.4 cm calculus (5/182).  Nonobstructing bilateral renal calculi.     Multiple urinary bladder calculi. Urinary bladder is decompressed by  Hendricks catheter. Ill-defined perivesicular fluid. High density urinary  bladder fluid and trace gas.    BOWEL: No obstruction or inflammatory change.    VASCULATURE: Nonaneurysmal abdominal aorta.    LYMPH NODE AND PERITONEUM: No enlarged lymph node.    MUSCULOSKELETAL: No aggressive osseous lesion. Mild degenerative  changes of the spine.    OTHER: None.      Impression    IMPRESSION:   1.  Mild to moderate bilateral hydroureteronephrosis to the  ureterovesical junctions, likely sequela of outlet obstruction.  2.  Nonobstructing left distal ureteral 0.4 cm calculus, and bilateral  nonobstructing renal calculi.  3.  High density urinary bladder fluid and perivesicular ill-defined  fluid/stranding (some of which is high density) could reflect blood  products and/or sequelae of infection. Correlate with urinalysis.  4.  Mild periureteral and perinephric inflammation could reflect  sequelae of pyelitis/pyelonephritis, not well assessed due to  noncontrast technique.  5.  Trace right greater than left pleural effusions.  6.  Indeterminate right adrenal gland thickening, and 2.2 cm  peritoneal nodule posterior to the right adrenal gland. Consider  correlation with outside imaging or attention on follow-up.    KARMA DIOR MD         SYSTEM ID:  [id number]       Discharge Medications   Current Discharge Medication List        START taking these medications    Details   cefdinir (OMNICEF) 300 MG capsule Take 1 capsule (300 mg) by mouth every 12 hours  Qty: 14 capsule, Refills: 0    Associated Diagnoses: Urinary  tract infection associated with indwelling urethral catheter, initial encounter  (H24)           CONTINUE these medications which have NOT CHANGED    Details   clonazePAM (KLONOPIN) 0.5 MG tablet Take 0.25-0.5 mg by mouth daily as needed for anxiety or agitation      LANsoprazole (PREVACID) 30 MG DR capsule Take 30 mg by mouth 2 times daily      Multiple Vitamin (ONE-A-DAY ESSENTIAL) TABS Take 1 tablet by mouth daily           Allergies   Allergies   Allergen Reactions    Pcn [Penicillins] Hives

## 2024-01-31 NOTE — PLAN OF CARE
Goal Outcome Evaluation:    Plan of Care Reviewed With: patient    Overall Patient Progress: no change  Overall Patient Progress: no change    Outcome Evaluation: Patient alert and oriented; withdrawn but cooperative. Vitals stable; on room air. Declined interventions for pain in bilateral hips and intermittent nausea. He declined assistance with repositioning, linen change, and bed bath this evening. Tolerated minced and moist meal without emesis. Adequate output of cloudy urine through astorga. He hopes to discharge home tomorrow.    Problem: Adult Inpatient Plan of Care  Goal: Absence of Hospital-Acquired Illness or Injury  Outcome: Progressing  Intervention: Identify and Manage Fall Risk  Recent Flowsheet Documentation  Taken 1/30/2024 1711 by Radha Machado RN  Safety Promotion/Fall Prevention:   activity supervised   assistive device/personal items within reach   clutter free environment maintained   lighting adjusted   nonskid shoes/slippers when out of bed   safety round/check completed  Intervention: Prevent Skin Injury  Recent Flowsheet Documentation  Taken 1/30/2024 2120 by Radha Machado RN  Body Position: position changed independently  Taken 1/30/2024 1711 by Radha Machado RN  Device Skin Pressure Protection: absorbent pad utilized/changed  Intervention: Prevent Infection  Recent Flowsheet Documentation  Taken 1/30/2024 1711 by Radha Machado RN  Infection Prevention:   rest/sleep promoted   hand hygiene promoted     Problem: Fall Injury Risk  Goal: Absence of Fall and Fall-Related Injury  Outcome: Progressing  Intervention: Identify and Manage Contributors  Recent Flowsheet Documentation  Taken 1/30/2024 1711 by Radha Machado RN  Medication Review/Management: medications reviewed  Intervention: Promote Injury-Free Environment  Recent Flowsheet Documentation  Taken 1/30/2024 1711 by Radha Machado RN  Safety Promotion/Fall Prevention:   activity supervised    assistive device/personal items within reach   clutter free environment maintained   lighting adjusted   nonskid shoes/slippers when out of bed   safety round/check completed

## 2024-02-02 ENCOUNTER — PATIENT OUTREACH (OUTPATIENT)
Dept: CARE COORDINATION | Facility: CLINIC | Age: 79
End: 2024-02-02
Payer: COMMERCIAL

## 2024-02-02 LAB
BACTERIA BLD CULT: ABNORMAL
BACTERIA BLD CULT: ABNORMAL

## 2024-02-02 NOTE — PROGRESS NOTES
Connected Care Resource Center:   [facility] Resource Center Contact  [facility]/Voicemail     Clinical Data: Post-Discharge Outreach     Outreach attempted x 2.  Left message on patient's voicemail, providing [facility]'s central phone number of [phone] ([phone]) for questions/concerns and/or to schedule an appt with an [facility] provider, if they do not have a PCP.      Plan:  [facility] will do no further outreaches at this time.       BRANDEN Lee  Connected Care Resource [place], [facility]    *Connected Care Resource Team does NOT follow patient ongoing. Referrals are identified based on internal discharge reports and the outreach is to ensure patient has an understanding of their discharge instructions.

## 2024-02-04 LAB
BACTERIA BLD CULT: ABNORMAL

## 2024-02-11 ENCOUNTER — HOSPITAL ENCOUNTER (EMERGENCY)
Facility: CLINIC | Age: 79
Discharge: HOME | End: 2024-02-11
Attending: EMERGENCY MEDICINE | Admitting: EMERGENCY MEDICINE
Payer: COMMERCIAL

## 2024-02-11 VITALS
RESPIRATION RATE: 18 BRPM | HEART RATE: 118 BPM | HEIGHT: 72 IN | OXYGEN SATURATION: 97 % | TEMPERATURE: 97.8 F | WEIGHT: 190 LBS | SYSTOLIC BLOOD PRESSURE: 169 MMHG | BODY MASS INDEX: 25.73 KG/M2 | DIASTOLIC BLOOD PRESSURE: 98 MMHG

## 2024-02-11 DIAGNOSIS — T83.9XXA FOLEY CATHETER PROBLEM, INITIAL ENCOUNTER (H): ICD-10-CM

## 2024-02-11 PROCEDURE — 99283 EMERGENCY DEPT VISIT LOW MDM: CPT | Performed by: EMERGENCY MEDICINE

## 2024-02-11 PROCEDURE — 99284 EMERGENCY DEPT VISIT MOD MDM: CPT | Mod: 25 | Performed by: EMERGENCY MEDICINE

## 2024-02-11 PROCEDURE — 250N000009 HC RX 250: Performed by: EMERGENCY MEDICINE

## 2024-02-11 PROCEDURE — 51798 US URINE CAPACITY MEASURE: CPT | Performed by: EMERGENCY MEDICINE

## 2024-02-11 PROCEDURE — 51702 INSERT TEMP BLADDER CATH: CPT | Performed by: EMERGENCY MEDICINE

## 2024-02-11 RX ORDER — LIDOCAINE HYDROCHLORIDE 20 MG/ML
JELLY TOPICAL ONCE
Status: COMPLETED | OUTPATIENT
Start: 2024-02-11 | End: 2024-02-11

## 2024-02-11 RX ADMIN — LIDOCAINE HYDROCHLORIDE: 20 JELLY TOPICAL at 22:30

## 2024-02-11 ASSESSMENT — ACTIVITIES OF DAILY LIVING (ADL): ADLS_ACUITY_SCORE: 35

## 2024-02-12 NOTE — ED PROVIDER NOTES
History     Chief Complaint   Patient presents with    Catheter Problem     No urine output since this afternoon     HPI  Jerry Jay is a 78 year old male who presents to the emergency department reporting little to no output from his Hendricks catheter throughout the day today.  He reports he has been urinating around the catheter.  He denies fevers.  He does complain of suprapubic discomfort and feels as though his bladder is full.    Allergies:  Allergies   Allergen Reactions    Sertraline Anxiety    Citalopram      Intolerance due to agitation    Paroxetine Other (See Comments)    Pcn [Penicillins] Hives    Tilactase      Lactose intolerant    Alprazolam Other (See Comments)     Patient stated that the alprazolam made his anxiety 100% worse    Cefdinir Anxiety     Felt very weird , irritable and agitated    Levofloxacin Anxiety     Depression, nightmares, night sweats, anxiety, agitated easily, paranoia       Problem List:    Patient Active Problem List    Diagnosis Date Noted    Sepsis (H) 2024     Priority: Medium    CARDIOVASCULAR SCREENING; LDL GOAL LESS THAN 160 10/31/2010     Priority: Medium        Past Medical History:    No past medical history on file.    Past Surgical History:    No past surgical history on file.    Family History:    No family history on file.    Social History:  Marital Status:  Single [1]  Social History     Tobacco Use    Smoking status: Former     Types: Cigarettes     Quit date: 1980     Years since quittin.5   Substance Use Topics    Alcohol use: No    Drug use: No        Medications:    cefdinir (OMNICEF) 300 MG capsule  clonazePAM (KLONOPIN) 0.5 MG tablet  LANsoprazole (PREVACID) 30 MG DR capsule  Multiple Vitamin (ONE-A-DAY ESSENTIAL) TABS          Review of Systems    Physical Exam   BP: (!) 169/98  Pulse: 118  Temp: 97.8  F (36.6  C)  Resp: 18  Height: 182.9 cm (6')  Weight: 86.2 kg (190 lb)  SpO2: 97 %      Physical Exam  Vitals and nursing note  reviewed.   Constitutional:       General: He is not in acute distress.     Appearance: He is well-developed. He is not ill-appearing, toxic-appearing or diaphoretic.   HENT:      Head: Normocephalic and atraumatic.      Mouth/Throat:      Lips: Pink.      Mouth: Mucous membranes are moist.      Pharynx: Oropharynx is clear. No oropharyngeal exudate.   Eyes:      General: Lids are normal. No scleral icterus.     Extraocular Movements: Extraocular movements intact.      Right eye: No nystagmus.      Left eye: No nystagmus.      Conjunctiva/sclera: Conjunctivae normal.      Pupils: Pupils are equal, round, and reactive to light.   Neck:      Thyroid: No thyromegaly.      Vascular: No JVD.      Trachea: No tracheal deviation.   Cardiovascular:      Rate and Rhythm: Normal rate and regular rhythm.      Pulses: Normal pulses.      Heart sounds: Normal heart sounds. No murmur heard.     No friction rub. No gallop.   Pulmonary:      Effort: Pulmonary effort is normal. No respiratory distress.      Breath sounds: Normal breath sounds.   Abdominal:      General: Bowel sounds are normal. There is no distension.      Palpations: Abdomen is soft. There is no mass.      Tenderness: There is no abdominal tenderness. There is no guarding or rebound.      Comments: Mildly distended bladder palpable.  Ultrasound demonstrates balloon in bladder as well as bladder distended with urine.   Musculoskeletal:         General: No tenderness. Normal range of motion.      Cervical back: Normal range of motion and neck supple. No erythema or rigidity.      Right lower leg: No edema.      Left lower leg: No edema.   Lymphadenopathy:      Cervical: No cervical adenopathy.   Skin:     General: Skin is warm and dry.      Capillary Refill: Capillary refill takes less than 2 seconds.      Coloration: Skin is not pale.      Findings: No erythema or rash.   Neurological:      Mental Status: He is alert and oriented to person, place, and time.       Cranial Nerves: No cranial nerve deficit.      Sensory: No sensory deficit.      Motor: Motor function is intact.   Psychiatric:         Mood and Affect: Mood and affect normal.         Speech: Speech normal.         Behavior: Behavior normal.         ED Course                 Procedures             No results found for this or any previous visit (from the past 24 hour(s)).    Medications   lidocaine (XYLOCAINE) 2 % external gel ( Topical $Given 2/11/24 4962)       Assessments & Plan (with Medical Decision Making)     I have reviewed the nursing notes.    I have reviewed the findings, diagnosis, plan and need for follow up with the patient.  This patient presented to the emergency department with a malfunctioning Hendricks catheter.  Catheter was changed and patient had good urine output from new catheter.  No clinical signs to suggest infection.  Patient was discharged with instructions for care and follow-up in good condition.        New Prescriptions    No medications on file       Final diagnoses:   Hendricks catheter problem, initial encounter (H24)       2/11/2024   [facility] EMERGENCY DEPT       Jeff Devries MD  02/11/24 5550

## 2024-02-12 NOTE — ED TRIAGE NOTES
Pt with catheter in that has not produced any urine since this afternoon. Pt had similar issue 2 weeks and ago and it needed to be replaced   Triage Assessment (Adult)       Row Name 02/11/24 8179          Triage Assessment    Airway WDL WDL        Respiratory WDL    Respiratory WDL WDL        Skin Circulation/Temperature WDL    Skin Circulation/Temperature WDL WDL        Cardiac WDL    Cardiac WDL WDL        Peripheral/Neurovascular WDL    Peripheral Neurovascular WDL WDL        Cognitive/Neuro/Behavioral WDL    Cognitive/Neuro/Behavioral WDL WDL

## 2024-02-12 NOTE — DISCHARGE INSTRUCTIONS
Follow-up as scheduled on Tuesday for your procedure.    Return to the emergency department for any problems.

## 2024-02-20 ENCOUNTER — NURSE TRIAGE (OUTPATIENT)
Dept: NURSING | Facility: CLINIC | Age: 79
End: 2024-02-20
Payer: COMMERCIAL

## 2024-02-20 NOTE — TELEPHONE ENCOUNTER
Pt calling re: blood in urine.    Had a astorga removed Friday (at [facility]) that he had had placed after a Cystoscopy and GreenLight laser photovaporization of the prostate on 2/13/24. Pt states this morning when he urinated, noticed a tinge of pink in his urine    No pain or burning.  No fever.  No urinary incontinence, back pain or foul smelling urine.  Did have to get up several times last night to urinate, but says this is normal for him.    Recommend calling Urology provider through [facility] for advisement since the discharge instructions do not give any clear expectations for recovery or what to do for bleeding after astorga removal. Pt states that he would prefer to be seen at a [facility] and will likely go in to the Ed at [facility] later today if he continues to see blood. Call back instructions discussed and pt verbalized understanding.    Layne Benson, RN, BSN  [facility]   Triage Nurse Advisor    Reason for Disposition   Urinary catheter, questions about   Has to get out of bed to urinate > 2 times a night (i.e., nocturia)    Additional Information   Negative: Unable to urinate (or only a few drops) > 4 hours and bladder feels very full (e.g., palpable bladder or strong urge to urinate)   Negative: Decreased urination and drinking very little and dehydration suspected (e.g., dark urine, no urine > 12 hours, very dry mouth, very lightheaded)   Negative: Patient sounds very sick or weak to the triager   Negative: Fever > 100.4 F  (38.0 C)   Negative: Side (flank) or lower back pain present   Negative: Bad or foul-smelling urine   Negative: Followed a female genital area injury (e.g., labia, vagina, vulva)   Negative: Followed a male genital area injury (penis, scrotum)   Negative: Vaginal discharge   Negative: Pus (white, yellow) or bloody discharge from end of penis   Negative: Pain or burning with passing urine (urination) and pregnant   Negative: Pain or burning with  passing urine (urination) and female   Negative: Pain or burning with passing urine (urination) and male   Negative: Pain or itching in the vulvar area   Negative: Pain in scrotum is main symptom   Negative: Blood in the urine is main symptom   Negative: Symptoms arising from use of a urinary catheter (e.g., Coude, Hendricks)   Negative: Shock suspected (e.g., cold/pale/clammy skin, too weak to stand, low BP, rapid pulse)   Negative: Sounds like a life-threatening emergency to the triager   Negative: Can't control passage of urine (i.e., urinary incontinence) and new-onset (< 2 weeks) or worsening   Negative: Urinating more frequently than usual (i.e., frequency)   Negative: Patient wants to be seen   Negative: Can't control passage of urine (i.e., urinary incontinence, wetting self) and present > 2 weeks   Negative: Urination is difficult to start (i.e., hesitancy) or straining   Negative: Dribbling (losing urine) just after finishing urination (i.e., post-void dribbling)    Protocols used: Urinary Symptoms-A-OH, Urine - Blood In-A-OH